# Patient Record
Sex: FEMALE | Race: WHITE | NOT HISPANIC OR LATINO | Employment: FULL TIME | URBAN - METROPOLITAN AREA
[De-identification: names, ages, dates, MRNs, and addresses within clinical notes are randomized per-mention and may not be internally consistent; named-entity substitution may affect disease eponyms.]

---

## 2017-01-02 ENCOUNTER — OFFICE VISIT (OUTPATIENT)
Dept: FAMILY MEDICINE | Facility: CLINIC | Age: 32
End: 2017-01-02
Payer: COMMERCIAL

## 2017-01-02 VITALS
SYSTOLIC BLOOD PRESSURE: 118 MMHG | BODY MASS INDEX: 18.32 KG/M2 | OXYGEN SATURATION: 98 % | HEIGHT: 69 IN | HEART RATE: 80 BPM | RESPIRATION RATE: 14 BRPM | TEMPERATURE: 97.7 F | WEIGHT: 123.7 LBS | DIASTOLIC BLOOD PRESSURE: 60 MMHG

## 2017-01-02 DIAGNOSIS — N92.0 MENORRHAGIA WITH REGULAR CYCLE: ICD-10-CM

## 2017-01-02 DIAGNOSIS — Z00.00 ROUTINE GENERAL MEDICAL EXAMINATION AT A HEALTH CARE FACILITY: Primary | ICD-10-CM

## 2017-01-02 PROCEDURE — 99395 PREV VISIT EST AGE 18-39: CPT | Performed by: INTERNAL MEDICINE

## 2017-01-02 PROCEDURE — 87624 HPV HI-RISK TYP POOLED RSLT: CPT | Mod: 90 | Performed by: INTERNAL MEDICINE

## 2017-01-02 PROCEDURE — G0145 SCR C/V CYTO,THINLAYER,RESCR: HCPCS | Mod: 90 | Performed by: INTERNAL MEDICINE

## 2017-01-02 PROCEDURE — 99000 SPECIMEN HANDLING OFFICE-LAB: CPT | Performed by: INTERNAL MEDICINE

## 2017-01-02 RX ORDER — ETHYNODIOL DIACETATE AND ETHINYL ESTRADIOL 1 MG-35MCG
1 KIT ORAL DAILY
Qty: 84 TABLET | Refills: 4 | Status: SHIPPED | OUTPATIENT
Start: 2017-01-02 | End: 2017-12-18

## 2017-01-02 NOTE — PROGRESS NOTES
"   SUBJECTIVE:     CC: Nabila Gee is an 31 year old woman who presents for preventive health visit.       Physical  Annual:     Getting at least 3 servings of Calcium per day::  Yes    Bi-annual eye exam::  Yes    Dental care twice a year::  Yes    Sleep apnea or symptoms of sleep apnea::  None    Diet::  Regular (no restrictions)    Frequency of exercise::  1 day/week    Duration of exercise::  15-30 minutes    Taking medications regularly::  Yes    Medication side effects::  None    Additional concerns today::  No      HISTORY OF PRESENT ILLNESS:    Intermittent Chest Pain:  The patient reports she often experiences intermittent chest pain which her coworkers believe is the result of increased stomach acid production or heartburn. She thinks her chest pain is due to periodic spikes in stress or anxiety.    Menorrhagia:  When asked, the patient reports she takes her current birth control medication regularly, but was told to skip the \"placebo\" doses at the end of her cycle and to continue with her next medication cycle.      Today's PHQ-2 Score:   PHQ-2 ( 1999 [employer]) 1/2/2017   Q1: Little interest or pleasure in doing things 0   Q2: Feeling down, depressed or hopeless 1   PHQ-2 Score 1   Little interest or pleasure in doing things -   Feeling down, depressed or hopeless -   PHQ-2 Score -     Abuse: Current or Past(Physical, Sexual or Emotional)- No  Do you feel safe in your environment - Yes    Social History   Substance Use Topics     Smoking status: Former Smoker -- 1.00 packs/day for 4 years     Smokeless tobacco: Never Used     Alcohol Use: 2.5 oz/week     5 Standard drinks or equivalent per week      Comment: 1 glass of wine daily     The patient does not drink >3 drinks per day nor >7 drinks per week.    Recent Labs   Lab Test  04/01/14   0726  09/11/11   1748   CHOL  132  113   HDL  56  48*   LDL  59  52   TRIG  80  62   CHOLHDLRATIO  2.3  2.3     Reviewed orders with patient.  Reviewed health " maintenance and updated orders accordingly - Yes    Mammo Decision Support:  Mammogram not appropriate for this patient based on age.    Pertinent mammograms are reviewed under the imaging tab.  History of abnormal Pap smear: YES - LSIL with STEPHANIE 1/2010 - Has completed a couple of colposcopies since but has not been treated; 2011 and on Pap smears have been normal; Continue performing PAP at routine interval (every 3 years recommended).  All Histories reviewed and updated in Epic.    Past Medical History   Diagnosis Date     SIADH (syndrome of inappropriate ADH production) (H) 9/11     hospitalized. will see Endocrinologist     LSIL (low grade squamous intraepithelial lesion) on Pap smear 2009, 2010     STEPHANIE I on colp 4/2010      Past Surgical History   Procedure Laterality Date     Surgical history of -   4/10     colposcopy; mild dysplasia; no treatment.     Surgical history of -   1/11     colposcopy; recommended repeat     Dilation and curettage, hysteroscopy diagnostic, combined N/A 2/12/2016     Procedure: COMBINED DILATION AND CURETTAGE, HYSTEROSCOPY DIAGNOSTIC;  Surgeon: Aj Clemons MD;  Location:  OR         REVIEW OF SYSTEMS:  C: NEGATIVE for fever, chills, or change in weight  E: NEGATIVE for vision changes or irritation  E/M: NEGATIVE for ear, mouth and throat problems  R: NEGATIVE for significant cough or SOB  CV:  NEGATIVE for palpitations or peripheral edema  GI: NEGATIVE for nausea, abdominal pain, or change in bowel habits  B: NEGATIVE for masses, tenderness, or nipple discharge  : Hx of Excessive or Frequent Menstruation - use of Zovia- targeting 4 cycles per year; NEGATIVE for unusual urinary or vaginal symptoms;   M: NEGATIVE for significant arthralgias or myalgia  N: NEGATIVE for weakness, dizziness or paresthesias  E: NEGATIVE for temperature intolerance, or skin/hair changes  P: NEGATIVE for changes in mood or affect    This document serves as a record of the services and decisions  "personally performed and made by Lilian Santiago MD. It was created on her behalf by Alethea Murray, a trained medical scribe. The creation of this document is based the provider's statements to the medical scribe.  Alethea Murray, January 2, 2017 11:21 AM     Problem list, Medication list, Allergies, and Medical/Social/Surgical histories reviewed in [facility] and updated as appropriate.  Labs reviewed in EPIC  OBJECTIVE:     /60 mmHg  Pulse 80  Temp(Src) 97.7  F (36.5  C) (Oral)  Resp 14  Ht 5' 8.5\" (1.74 m)  Wt 123 lb 11.2 oz (56.11 kg)  BMI 18.53 kg/m2  SpO2 98%  LMP 08/02/2016 (Approximate)    EXAM:  GENERAL: Patient appears healthy, alert and not distressed.  EYES: Eyes appear grossly normal to inspection, with normal conjunctivae and sclerae  HENT: Ear canals and TM's appear normal, mouth is without ulcers or lesions, oropharynx is clear and oral mucous membranes are moist  NECK: No adenopathy present, no asymmetry, masses, or scars noted, thyroid is normal to palpation  RESP: Lungs are clear to auscultation - no rales, rhonchi or wheezes present  CV: Regular rate and rhythm, normal S1 S2 heart sounds, no ectopy, no peripheral edema present, peripheral pulses normal, no carotid bruit.  ABDOMEN: Soft, nontender, no hepatosplenomegaly, no masses, normal bowel sounds  BREAST: Normal without masses, tenderness or nipple discharge and no palpable axillary masses or adenopathy   (female): Normal female external genitalia, normal urethral meatus, vaginal mucosa, normal cervix/adnexa/uterus without masses or discharge  MS: No gross musculoskeletal defects noted, no edema, gait is age appropriate without ataxia, normal heel/toe walk  BACK: Normal forward bend and forward curvature.  SKIN: No suspicious rashes, lesions, or moles.  NEURO: Patient exhibits normal strength and tone, normal speech and mentation, DTR's fully intact, normal Romberg's test  PSYCH: Mentation appears normal, affect is " "normal/bright    Diagnostic Test Results:  No results found for this or any previous visit (from the past 24 hour(s)).     ASSESSMENT/PLAN:     (Z00.00) Routine general medical examination at a health care facility  (primary encounter diagnosis)  Comment: Annual preventative visit. Medications reviewed with patient. TDAP 2009  Continue to eat healthy and get regular exercise - goal of 150 minutes per week  Plan: Pap imaged thin layer screen with HPV -         recommended age 30 - 65 years (select HPV order        below), HPV High Risk Types DNA Cervical          (N92.0) Menorrhagia with regular cycle  Comment: Currently taking Zovia regularly, but was told to skip the \"placebo\" week and to continue onto her next medication cycle; Symptoms have improved.  Plan: ethynodiol-ethinyl estradiol (ZOVIA 1/35E, 28,)        1-35 MG-MCG per tablet            COUNSELING:  Reviewed preventive health counseling, as reflected in patient instructions       Regular exercise       Healthy diet/nutrition     reports that she has quit smoking. She has never used smokeless tobacco.  Estimated body mass index is 18.53 kg/(m^2) as calculated from the following:    Height as of this encounter: 5' 8.5\" (1.74 m).    Weight as of this encounter: 123 lb 11.2 oz (56.11 kg).       Counseling Resources:  ATP IV Guidelines  Pooled Cohorts Equation Calculator  Breast Cancer Risk Calculator  FRAX Risk Assessment  ICSI Preventive Guidelines  Dietary Guidelines for Americans, 2010  USDA's MyPlate  ASA Prophylaxis  Lung CA Screening    The information in this document, created by a medical scribe for me, accurately reflects the services I personally performed and the decisions made by me. I have reviewed and approved this document for accuracy.  Dr. Lilian Santiago, 11:34 AM, January 2, 2017    Lilian Santiago MD  Internal Medicine   [facility]    Answers for HPI/ROS submitted by the patient on 1/1/2017   PHQ-2 Depressed: Not at all, " Several days  PHQ-2 Score: 1

## 2017-01-02 NOTE — PATIENT INSTRUCTIONS
Preventive Health Recommendations  Female Ages 26 - 39  Yearly exam:   See your health care provider every year in order to    Review health changes.     Discuss preventive care.      Review your medicines if you your doctor has prescribed any.    Until age 30: Get a Pap test every three years (more often if you have had an abnormal result).    After age 30: Talk to your doctor about whether you should have a Pap test every 3 years or have a Pap test with HPV screening every 5 years.   You do not need a Pap test if your uterus was removed (hysterectomy) and you have not had cancer.  You should be tested each year for STDs (sexually transmitted diseases), if you're at risk.   Talk to your provider about how often to have your cholesterol checked.  If you are at risk for diabetes, you should have a diabetes test (fasting glucose).  Shots: Get a flu shot each year. Get a tetanus shot every 10 years.   Nutrition:     Eat at least 5 servings of fruits and vegetables each day.    Eat whole-grain bread, whole-wheat pasta and brown rice instead of white grains and rice.    Talk to your provider about Calcium and Vitamin D.     Lifestyle    Exercise at least 150 minutes a week (30 minutes a day, 5 days of the week). This will help you control your weight and prevent disease.    Limit alcohol to one drink per day.    No smoking.     Wear sunscreen to prevent skin cancer.    See your dentist every six months for an exam and cleaning.      I recommend you start monitoring for potential triggers at the onset of your chest pain. Stress and anxiety can cause an increased production of stomach acid which can cause chest pain. Therefore, I recommend that you purchase some over-the-counter Zantac, Rolaids, or Tums to keep on hand which will help by neutralizing excess stomach acid.

## 2017-01-02 NOTE — NURSING NOTE
"Chief Complaint   Patient presents with     Physical     PT IS NOT FASTING   ATE SOME EGGS AND HASHBROWNS AT 9;15       Initial /60 mmHg  Pulse 80  Temp(Src) 97.7  F (36.5  C) (Oral)  Resp 14  Ht 5' 8.5\" (1.74 m)  Wt 123 lb 11.2 oz (56.11 kg)  BMI 18.53 kg/m2  SpO2 98%  LMP 08/02/2016 (Approximate) Estimated body mass index is 18.53 kg/(m^2) as calculated from the following:    Height as of this encounter: 5' 8.5\" (1.74 m).    Weight as of this encounter: 123 lb 11.2 oz (56.11 kg).  BP completed using cuff size: regular    "

## 2017-01-02 NOTE — MR AVS SNAPSHOT
After Visit Summary   1/2/2017    Nabila Gee    MRN: [id number]           Patient Information     Date Of Birth          1985        Visit Information        Provider Department      1/2/2017 11:00 AM Lilian Santiago MD [facility]        Today's Diagnoses     Routine general medical examination at a health care facility    -  1     Menorrhagia with regular cycle           Care Instructions      Preventive Health Recommendations  Female Ages 26 - 39  Yearly exam:   See your health care provider every year in order to    Review health changes.     Discuss preventive care.      Review your medicines if you your doctor has prescribed any.    Until age 30: Get a Pap test every three years (more often if you have had an abnormal result).    After age 30: Talk to your doctor about whether you should have a Pap test every 3 years or have a Pap test with HPV screening every 5 years.   You do not need a Pap test if your uterus was removed (hysterectomy) and you have not had cancer.  You should be tested each year for STDs (sexually transmitted diseases), if you're at risk.   Talk to your provider about how often to have your cholesterol checked.  If you are at risk for diabetes, you should have a diabetes test (fasting glucose).  Shots: Get a flu shot each year. Get a tetanus shot every 10 years.   Nutrition:     Eat at least 5 servings of fruits and vegetables each day.    Eat whole-grain bread, whole-wheat pasta and brown rice instead of white grains and rice.    Talk to your provider about Calcium and Vitamin D.     Lifestyle    Exercise at least 150 minutes a week (30 minutes a day, 5 days of the week). This will help you control your weight and prevent disease.    Limit alcohol to one drink per day.    No smoking.     Wear sunscreen to prevent skin cancer.    See your dentist every six months for an exam and cleaning.      I recommend you start monitoring for potential  "triggers at the onset of your chest pain. Stress and anxiety can cause an increased production of stomach acid which can cause chest pain. Therefore, I recommend that you purchase some over-the-counter Zantac, Rolaids, or Tums to keep on hand which will help by neutralizing excess stomach acid.        Follow-ups after your visit        Who to contact     If you have questions or need follow up information about today's clinic visit or your schedule please contact [facility] directly at [phone].  Normal or non-critical lab and imaging results will be communicated to you by [employer], letter or phone within 4 business days after the clinic has received the results. If you do not hear from us within 7 days, please contact the clinic through [employer] or phone. If you have a critical or abnormal lab result, we will notify you by phone as soon as possible.  Submit refill requests through [facility] or call your pharmacy and they will forward the refill request to us. Please allow 3 business days for your refill to be completed.          Additional Information About Your Visit        [facility] Information     [facility] gives you secure access to your electronic health record. If you see a primary care provider, you can also send messages to your care team and make appointments. If you have questions, please call your primary care clinic.  If you do not have a primary care provider, please call [phone] and they will assist you.        Your Vitals Were     Pulse Temperature Respirations    80 97.7  F (36.5  C) (Oral) 14    Height BMI (Body Mass Index) Pulse Oximetry    5' 8.5\" (1.74 m) 18.53 kg/m2 98%    Last Period          08/02/2016 (Approximate)         Blood Pressure from Last 3 Encounters:   01/02/17 118/60   02/12/16 118/68   02/09/16 106/62    Weight from Last 3 Encounters:   01/02/17 123 lb 11.2 oz (56.11 kg)   02/12/16 118 lb (53.524 kg)   02/09/16 118 lb 14.4 oz (53.933 kg)              We " Performed the Following     HPV High Risk Types DNA Cervical     Pap imaged thin layer screen with HPV - recommended age 30 - 65 years (select HPV order below)          Today's Medication Changes          These changes are accurate as of: 1/2/17 11:33 AM.  If you have any questions, ask your nurse or doctor.               Stop taking these medicines if you haven't already. Please contact your care team if you have questions.     HYDROcodone-acetaminophen 5-325 MG per tablet   Commonly known as:  NORCO   Stopped by:  Lilian Santiago MD           ondansetron 4 MG ODT tab   Commonly known as:  ZOFRAN-ODT   Stopped by:  Lilian Santiago MD                Where to get your medicines      These medications were sent to [facility] [address] [facility] [address] [facility] AT [place] [address] & [facility]  [address] [facility] [address]     Phone:  [phone]    - ethynodiol-ethinyl estradiol 1-35 MG-MCG per tablet             Primary Care Provider Office Phone # Fax #    Lilian Santiago [phone]       [facility] [address] [facility] [address]        Thank you!     Thank you for choosing [facility]  for your care. Our goal is always to provide you with excellent care. Hearing back from our patients is one way we can continue to improve our services. Please take a few minutes to complete the written survey that you may receive in the mail after your visit with us. Thank you!             Your Updated Medication List - Protect others around you: Learn how to safely use, store and throw away your medicines at www.disposemymeds.org.          This list is accurate as of: 1/2/17 11:33 AM.  Always use your most recent med list.                   Brand Name Dispense Instructions for use    ethynodiol-ethinyl estradiol 1-35 MG-MCG per tablet    ZOVIA 1/35E (28)    84 tablet    Take 1 tablet by mouth daily       FLONASE 50 MCG/ACT  spray   Generic drug:  fluticasone     1 Package    Spray 1-2 sprays into both nostrils daily as needed for rhinitis or allergies

## 2017-01-04 LAB
COPATH REPORT: NORMAL
PAP: NORMAL

## 2017-01-06 LAB
FINAL DIAGNOSIS: NORMAL
HPV HR 12 DNA CVX QL NAA+PROBE: NEGATIVE
HPV16 DNA SPEC QL NAA+PROBE: NEGATIVE
HPV18 DNA SPEC QL NAA+PROBE: NEGATIVE
SPECIMEN DESCRIPTION: NORMAL

## 2017-12-18 DIAGNOSIS — N92.0 MENORRHAGIA WITH REGULAR CYCLE: ICD-10-CM

## 2017-12-20 RX ORDER — ETHYNODIOL DIACETATE AND ETHINYL ESTRADIOL 1 MG-35MCG
KIT ORAL
Qty: 84 TABLET | Refills: 0 | Status: SHIPPED | OUTPATIENT
Start: 2017-12-20 | End: 2018-02-22

## 2017-12-20 NOTE — TELEPHONE ENCOUNTER
Prescription approved per [facility] Refill Protocol.  Jeane Lei RN

## 2018-01-16 ENCOUNTER — OFFICE VISIT (OUTPATIENT)
Dept: FAMILY MEDICINE | Facility: CLINIC | Age: 33
End: 2018-01-16
Payer: COMMERCIAL

## 2018-01-16 VITALS
TEMPERATURE: 98.1 F | BODY MASS INDEX: 19.05 KG/M2 | RESPIRATION RATE: 15 BRPM | WEIGHT: 128.6 LBS | DIASTOLIC BLOOD PRESSURE: 60 MMHG | OXYGEN SATURATION: 100 % | HEART RATE: 65 BPM | HEIGHT: 69 IN | SYSTOLIC BLOOD PRESSURE: 112 MMHG

## 2018-01-16 DIAGNOSIS — S33.5XXA LUMBAR SPRAIN, INITIAL ENCOUNTER: Primary | ICD-10-CM

## 2018-01-16 DIAGNOSIS — Z83.49 FAMILY HISTORY OF THYROID DISEASE: ICD-10-CM

## 2018-01-16 DIAGNOSIS — R63.5 WEIGHT GAIN: ICD-10-CM

## 2018-01-16 DIAGNOSIS — M62.830 PARASPINAL MUSCLE SPASM: ICD-10-CM

## 2018-01-16 DIAGNOSIS — L72.3 SEBACEOUS CYST: ICD-10-CM

## 2018-01-16 DIAGNOSIS — R59.9 REACTIVE LYMPHADENOPATHY: ICD-10-CM

## 2018-01-16 PROCEDURE — 36415 COLL VENOUS BLD VENIPUNCTURE: CPT | Performed by: INTERNAL MEDICINE

## 2018-01-16 PROCEDURE — 84443 ASSAY THYROID STIM HORMONE: CPT | Performed by: INTERNAL MEDICINE

## 2018-01-16 PROCEDURE — 99214 OFFICE O/P EST MOD 30 MIN: CPT | Performed by: INTERNAL MEDICINE

## 2018-01-16 RX ORDER — CYCLOBENZAPRINE HCL 10 MG
5-10 TABLET ORAL 3 TIMES DAILY PRN
Qty: 20 TABLET | Refills: 1 | Status: SHIPPED | OUTPATIENT
Start: 2018-01-16 | End: 2018-03-07

## 2018-01-16 RX ORDER — NAPROXEN 500 MG/1
500 TABLET ORAL 2 TIMES DAILY PRN
Qty: 60 TABLET | Refills: 0 | Status: SHIPPED | OUTPATIENT
Start: 2018-01-16 | End: 2018-03-07

## 2018-01-16 RX ORDER — CETIRIZINE HYDROCHLORIDE 10 MG/1
10 TABLET ORAL DAILY PRN
COMMUNITY
End: 2022-12-13

## 2018-01-16 NOTE — PATIENT INSTRUCTIONS
Exercises to Strengthen Your Lower Back  Strong lower back and abdominal muscles work together to support your spine. The exercises below will help strengthen the lower back. It is important that you begin exercising slowly and increase levels gradually.  Always begin any exercise program with stretching. If you feel pain while doing any of these exercises, stop and talk to your doctor about a more specific exercise program that better suits your condition.   Low back stretch  The point of stretching is to make you more flexible and increase your range of motion. Stretch only as much as you are able. Stretch slowly. Do not push your stretch to the limit. If at any point you feel pain while stretching, this is your (temporary) limit.    Lie on your back with your knees bent and both feet on the ground.    Slowly raise your left knee to your chest as you flatten your lower back against the floor. Hold for 5 seconds.    Relax and repeat the exercise with your right knee.    Do 10 of these exercises for each leg.    Repeat hugging both knees to your chest at the same time.  Building lower back strength  Start your exercise routine with 10 to 30 minutes a day, 1 to 3 times a day.  Initial exercises  Lying on your back:  1. Ankle pumps: Move your foot up and down, towards your head, and then away. Repeat 10 times with each foot.  2. Heel slides: Slowly bend your knee, drawing the heel of your foot towards you. Then slide your heel/foot from you, straightening your knee. Do not lift your foot off the floor (this is not a leg lift).  3. Abdominal contraction: Bend your knees and put your hands on your stomach. Tighten your stomach muscles. Hold for 5 seconds, then relax. Repeat 10 times.  4. Straight leg raise: Bend one leg at the knee and keep the other leg straight. Tighten your stomach muscles. Slowly lift your straight leg 6 to 12 inches off the floor and hold for up to 5 seconds. Repeat 10 times on each  side.  Standin. Wall squats: Stand with your back against the wall. Move your feet about 12 inches away from the wall. Tighten your stomach muscles, and slowly bend your knees until they are at about a 45 degree angle. Do not go down too far. Hold about 5 seconds. Then slowly return to your starting position. Repeat 10 times.  2. Heel raises: Stand facing the wall. Slowly raise the heels of your feet up and down, while keeping your toes on the floor. If you have trouble balancing, you can touch the wall with your hands. Repeat 10 times.  More advanced exercises  When you feel comfortable enough, try these exercises.  1. Kneeling lumbar extension: Begin on your hands and knees. At the same time, raise and straighten your right arm and left leg until they are parallel to the ground. Hold for 2 seconds and come back slowly to a starting position. Repeat with left arm and right leg, alternating 10 times.  2. Prone lumbar extension: Lie face down, arms extended overhead, palms on the floor. At the same time, raise your right arm and left leg as high as comfortably possible. Hold for 10 seconds and slowly return to start. Repeat with left arm and right leg, alternating 10 times. Gradually build up to 20 times. (Advanced: Repeat this exercise raising both arms and both legs a few inches off the floor at the same time. Hold for 5 seconds and release.)  3. Pelvic tilt: Lie on the floor on your back with your knees bent at 90 degrees. Your feet should be flat on the floor. Inhale, exhale, then slowly contract your abdominal muscles bringing your navel toward your spine. Let your pelvis rock back until your lower back is flat on the floor. Hold for 10 seconds while breathing smoothly.  4. Abdominal crunch: Perform a pelvic tilt (above) flattening your lower back against the floor. Holding the tension in your abdominal muscles, take another breath and raise your shoulder blades off the ground (this is not a full sit-up).  Keep your head in line with your body (don t bend your neck forward). Hold for 2 seconds, then slowly lower.  Date Last Reviewed: 6/1/2016 2000-2017 [employer]. [address] [place] [address], [place] [address]. All rights reserved. This information is not intended as a substitute for professional medical care. Always follow your healthcare professional's instructions.

## 2018-01-16 NOTE — PROGRESS NOTES
SUBJECTIVE:   Nabila Gee is a 32 year old female who presents to clinic today for the following health issues:    Weight Concern:   Patient states that she has been eating healthy and exercising, but has gained about 10 pounds over the past 1 1/2 years. Is concerned about this weight gain.     Back Pain    Duration: 4 days        Specific cause: none, no injury/trauma    Description:   Location of pain: low back bilateral  Character of pain: sharp, dull ache, cramping, constant and waxing and waning  Pain radiation: none  New numbness or weakness in legs, not attributed to pain: no     Intensity: Currently 4/10, At its worst 9/10    History:   Pain interferes with job: YES  History of back problems: no prior back problems  Any previous MRI or X-rays: None  Sees a specialist for back pain: No  Therapies tried without relief: heat and NSAIDs    Alleviating factors:   Improved by: nothing      Precipitating factors:  Worsened by: Bending and Sitting    Small bumps behind the right ear    Duration: for about 4 days    Description (location/character/radiation): 2 small lumps behind the right ear.    Intensity: mild    Accompanying signs and symptoms: pain is mild and tender when touching them    History (similar episodes/previous evaluation): None    Precipitating or alleviating factors: None    Therapies tried and outcome: None     Problem list and histories reviewed & adjusted, as indicated.  Additional history: as documented    BP Readings from Last 3 Encounters:   01/16/18 112/60   01/02/17 118/60   02/12/16 118/68    Wt Readings from Last 3 Encounters:   01/16/18 128 lb 9.6 oz (58.3 kg)   01/02/17 123 lb 11.2 oz (56.1 kg)   02/12/16 118 lb (53.5 kg)        Reviewed and updated as needed this visit by clinical staff  Tobacco  Allergies  Meds  Problems  Med Hx  Surg Hx  Fam Hx  Soc Hx        Reviewed and updated as needed this visit by Provider  Allergies  Meds  Problems     ROS:  CONSTITUTIONAL:  "POSITIVE for unexplained weight gain; NEGATIVE for fever or chills  INTEGUMENTARY/SKIN: POSITIVE for 2 small bumps behind the right ear; POSITIVE for ingrown hair to scalp on right; NEGATIVE for worrisome rashes, moles or lesions  GI: NEGATIVE for nausea, abdominal pain, heartburn, or change in bowel habits  : no difficulties with urination  MUSCULOSKELETAL: POSITIVE for bilateral low back pain  NEURO: NEGATIVE for weakness, dizziness or paresthesias    This document serves as a record of the services and decisions personally performed and made by Lilian Santiago MD. It was created on her behalf by Karly Black, a trained medical scribe. The creation of this document is based on the provider's statements to the medical scribe.   Karly Black, 3:59 PM, January 16, 2018    OBJECTIVE:   /60  Pulse 65  Temp 98.1  F (36.7  C) (Oral)  Resp 15  Ht 5' 8.75\" (1.746 m)  Wt 128 lb 9.6 oz (58.3 kg)  LMP 12/21/2017 (Exact Date)  SpO2 100%  BMI 19.13 kg/m2  Body mass index is 19.13 kg/(m^2).  GENERAL: healthy, alert and no distress  NECK: right postauricular lymphadenopathy, no asymmetry, masses, or scars  MS: increased tone of paraspinal muscles bilaterally, pain limits forward bending, extremities normal- no gross deformities noted  SKIN: small sebaceous cyst to scalp on right side, healing, no suspicious lesions or rashes  PSYCH: mentation appears normal and affect normal/bright  LYMPH: two small reactive postauricular lymph nodes on the right, nontender    Reactive lymph nodes, nontender   Diagnostic Test Results:  none     ASSESSMENT/PLAN:   (S33.5XXA) Lumbar sprain, initial encounter (primary encounter diagnosis)  Comment: forward bending limited by pain; ice/heat; walk in spine; NSAID and short term Cyclobenzaprine  Plan: naproxen (NAPROSYN) 500 MG tablet,         cyclobenzaprine (FLEXERIL) 10 MG tablet, [facility]         PT, HAND, AND CHIROPRACTIC REFERRAL          (M62.830) Paraspinal muscle spasm  Comment: noted " increased tone of paraspinal muscles; start Flexeril muscle relaxant; take naproxen one in the AM and one at night; referral to PT; alternate heat and cold; reviewed exercises and provided in AVS   Plan: cyclobenzaprine (FLEXERIL) 10 MG tablet, [facility]         PT, HAND, AND CHIROPRACTIC REFERRAL          (R63.5) Weight gain  Comment: #10 weight gain in past 2 years (since 2/2016); family hx of thyroid; would like thyroid labs done  Plan: TSH with free T4 reflex          (Z83.49) Family history of thyroid disease  Comment: mother with thyroid disease; will have thyroid labs completed today  Plan: TSH with free T4 reflex          Reactive Lymph Nodes:   Healing small sebaceous cyst to scalp on right; Two small reactive lymph nodes on right, postauricular, nontender   Recommended warm packs on right side    sebaceous cysts  No infections  Nuisance; no need for further evaluation    MEDICATIONS:   Orders Placed This Encounter   Medications     cetirizine (ZYRTEC) 10 MG tablet     Sig: Take 10 mg by mouth daily as needed for allergies     naproxen (NAPROSYN) 500 MG tablet     Sig: Take 1 tablet (500 mg) by mouth 2 times daily as needed for moderate pain     Dispense:  60 tablet     Refill:  0     cyclobenzaprine (FLEXERIL) 10 MG tablet     Sig: Take 0.5-1 tablets (5-10 mg) by mouth 3 times daily as needed for muscle spasms     Dispense:  20 tablet     Refill:  1     There are no discontinued medications.        Lilian Santiago MD  Internal Medicine  [facility] [place]    The information in this document, created by a medical scribe for me, accurately reflects the services I personally performed and the decisions made by me. I have reviewed and approved this document for accuracy.  Dr. Lilian Santiago, 4:22 PM, January 16, 2018

## 2018-01-16 NOTE — MR AVS SNAPSHOT
After Visit Summary   1/16/2018    Nabila Gee    MRN: [id number]           Patient Information     Date Of Birth          1985        Visit Information        Provider Department      1/16/2018 3:00 PM Lilian Santiago MD [facility]        Today's Diagnoses     Lumbar sprain, initial encounter    -  1    Paraspinal muscle spasm        Weight gain        Family history of thyroid disease          Care Instructions      Exercises to Strengthen Your Lower Back  Strong lower back and abdominal muscles work together to support your spine. The exercises below will help strengthen the lower back. It is important that you begin exercising slowly and increase levels gradually.  Always begin any exercise program with stretching. If you feel pain while doing any of these exercises, stop and talk to your doctor about a more specific exercise program that better suits your condition.   Low back stretch  The point of stretching is to make you more flexible and increase your range of motion. Stretch only as much as you are able. Stretch slowly. Do not push your stretch to the limit. If at any point you feel pain while stretching, this is your (temporary) limit.    Lie on your back with your knees bent and both feet on the ground.    Slowly raise your left knee to your chest as you flatten your lower back against the floor. Hold for 5 seconds.    Relax and repeat the exercise with your right knee.    Do 10 of these exercises for each leg.    Repeat hugging both knees to your chest at the same time.  Building lower back strength  Start your exercise routine with 10 to 30 minutes a day, 1 to 3 times a day.  Initial exercises  Lying on your back:  1. Ankle pumps: Move your foot up and down, towards your head, and then away. Repeat 10 times with each foot.  2. Heel slides: Slowly bend your knee, drawing the heel of your foot towards you. Then slide your heel/foot from you, straightening your  knee. Do not lift your foot off the floor (this is not a leg lift).  3. Abdominal contraction: Bend your knees and put your hands on your stomach. Tighten your stomach muscles. Hold for 5 seconds, then relax. Repeat 10 times.  4. Straight leg raise: Bend one leg at the knee and keep the other leg straight. Tighten your stomach muscles. Slowly lift your straight leg 6 to 12 inches off the floor and hold for up to 5 seconds. Repeat 10 times on each side.  Standin. Wall squats: Stand with your back against the wall. Move your feet about 12 inches away from the wall. Tighten your stomach muscles, and slowly bend your knees until they are at about a 45 degree angle. Do not go down too far. Hold about 5 seconds. Then slowly return to your starting position. Repeat 10 times.  2. Heel raises: Stand facing the wall. Slowly raise the heels of your feet up and down, while keeping your toes on the floor. If you have trouble balancing, you can touch the wall with your hands. Repeat 10 times.  More advanced exercises  When you feel comfortable enough, try these exercises.  1. Kneeling lumbar extension: Begin on your hands and knees. At the same time, raise and straighten your right arm and left leg until they are parallel to the ground. Hold for 2 seconds and come back slowly to a starting position. Repeat with left arm and right leg, alternating 10 times.  2. Prone lumbar extension: Lie face down, arms extended overhead, palms on the floor. At the same time, raise your right arm and left leg as high as comfortably possible. Hold for 10 seconds and slowly return to start. Repeat with left arm and right leg, alternating 10 times. Gradually build up to 20 times. (Advanced: Repeat this exercise raising both arms and both legs a few inches off the floor at the same time. Hold for 5 seconds and release.)  3. Pelvic tilt: Lie on the floor on your back with your knees bent at 90 degrees. Your feet should be flat on the floor.  Inhale, exhale, then slowly contract your abdominal muscles bringing your navel toward your spine. Let your pelvis rock back until your lower back is flat on the floor. Hold for 10 seconds while breathing smoothly.  4. Abdominal crunch: Perform a pelvic tilt (above) flattening your lower back against the floor. Holding the tension in your abdominal muscles, take another breath and raise your shoulder blades off the ground (this is not a full sit-up). Keep your head in line with your body (don t bend your neck forward). Hold for 2 seconds, then slowly lower.  Date Last Reviewed: 6/1/2016 2000-2017 [employer]. [address] [place] [address]. All rights reserved. This information is not intended as a substitute for professional medical care. Always follow your healthcare professional's instructions.                Follow-ups after your visit        Additional Services     [facility] PT, HAND, AND CHIROPRACTIC REFERRAL       **This order will print in the [facility] Scheduling Office**    Physical Therapy, Hand Therapy and Chiropractic Care are available through:    *[facility]  *[facility]  *[facility]    Call one number to schedule at any of the above locations: [phone].    Your provider has referred you to: Physical Therapy at [facility] or [facility] [place] [place]    Indication/Reason for Referral: Low Back Pain  Onset of Illness: 4 days, no injury recalled  Therapy Orders: Evaluate and Treat  Special Programs: Walk in Spine  Special Request: None    [place]      Additional Comments for the Therapist or Chiropractor:       Please be aware that coverage of these services is subject to the terms and limitations of your health insurance plan.  Call member services at your health plan with any benefit or coverage questions.      Please bring the following to your appointment:    *Your personal calendar for scheduling future appointments  *Comfortable clothing    "               Who to contact     If you have questions or need follow up information about today's clinic visit or your schedule please contact [facility] directly at [phone].  Normal or non-critical lab and imaging results will be communicated to you by Fileboardhart, letter or phone within 4 business days after the clinic has received the results. If you do not hear from us within 7 days, please contact the clinic through Fileboardhart or phone. If you have a critical or abnormal lab result, we will notify you by phone as soon as possible.  Submit refill requests through TinderBox or call your pharmacy and they will forward the refill request to us. Please allow 3 business days for your refill to be completed.          Additional Information About Your Visit        TinderBox Information     TinderBox gives you secure access to your electronic health record. If you see a primary care provider, you can also send messages to your care team and make appointments. If you have questions, please call your primary care clinic.  If you do not have a primary care provider, please call [phone] and they will assist you.        Care EveryWhere ID     This is your Care EveryWhere ID. This could be used by other organizations to access your [place] medical records  [id number]        Your Vitals Were     Pulse Temperature Respirations Height Last Period Pulse Oximetry    65 98.1  F (36.7  C) (Oral) 15 5' 8.75\" (1.746 m) 12/21/2017 (Exact Date) 100%    BMI (Body Mass Index)                   19.13 kg/m2            Blood Pressure from Last 3 Encounters:   01/16/18 112/60   01/02/17 118/60   02/12/16 118/68    Weight from Last 3 Encounters:   01/16/18 128 lb 9.6 oz (58.3 kg)   01/02/17 123 lb 11.2 oz (56.1 kg)   02/12/16 118 lb (53.5 kg)              We Performed the Following     [facility] PT, HAND, AND CHIROPRACTIC REFERRAL     TSH with free T4 reflex          Today's Medication Changes          These changes are accurate as " of: 1/16/18  4:20 PM.  If you have any questions, ask your nurse or doctor.               Start taking these medicines.        Dose/Directions    cyclobenzaprine 10 MG tablet   Commonly known as:  FLEXERIL   Used for:  Lumbar sprain, initial encounter, Paraspinal muscle spasm   Started by:  Lilian Santiago MD        Dose:  5-10 mg   Take 0.5-1 tablets (5-10 mg) by mouth 3 times daily as needed for muscle spasms   Quantity:  20 tablet   Refills:  1       naproxen 500 MG tablet   Commonly known as:  NAPROSYN   Used for:  Lumbar sprain, initial encounter   Started by:  Lilian Santiago MD        Dose:  500 mg   Take 1 tablet (500 mg) by mouth 2 times daily as needed for moderate pain   Quantity:  60 tablet   Refills:  0            Where to get your medicines      These medications were sent to [facility] [address] [facility] [address] [facility] AT [place] [address] & [facility]  [address] [facility] [address]     Phone:  [phone]     cyclobenzaprine 10 MG tablet    naproxen 500 MG tablet                Primary Care Provider Office Phone # Fax #    Lilian Santiago [phone] [id number]  [facility] [address]        Equal Access to Services     BENITO ROE AH: Hadii aad ku hadasho Soomaali, waaxda luqadaha, qaybta kaalmada adeegyada, waxay idiin hayaan elliot khkamron [place] . So [facility] [phone].    ATENCIÓN: Si habla español, tiene a nelson disposición servicios gratuitos de asistencia lingüística. ame al [phone].    We comply with applicable federal civil rights laws and [place] laws. We do not discriminate on the basis of race, color, national origin, age, disability, sex, sexual orientation, or gender identity.            Thank you!     Thank you for choosing [facility]  for your care. Our goal is always to provide you with excellent care. Hearing back from our patients is one way we can continue to improve our services.  Please take a few minutes to complete the written survey that you may receive in the mail after your visit with us. Thank you!             Your Updated Medication List - Protect others around you: Learn how to safely use, store and throw away your medicines at www.disposemymeds.org.          This list is accurate as of: 1/16/18  4:20 PM.  Always use your most recent med list.                   Brand Name Dispense Instructions for use Diagnosis    cetirizine 10 MG tablet    zyrTEC     Take 10 mg by mouth daily as needed for allergies        cyclobenzaprine 10 MG tablet    FLEXERIL    20 tablet    Take 0.5-1 tablets (5-10 mg) by mouth 3 times daily as needed for muscle spasms    Lumbar sprain, initial encounter, Paraspinal muscle spasm       FLONASE 50 MCG/ACT spray   Generic drug:  fluticasone     1 Package    Spray 1-2 sprays into both nostrils daily as needed for rhinitis or allergies    Nasal discharge, Seasonal allergies       naproxen 500 MG tablet    NAPROSYN    60 tablet    Take 1 tablet (500 mg) by mouth 2 times daily as needed for moderate pain    Lumbar sprain, initial encounter       ZOVIA 1/35E (28) 1-35 MG-MCG per tablet   Generic drug:  ethynodiol-ethinyl estradiol     84 tablet    TAKE 1 TABLET BY MOUTH DAILY    Menorrhagia with regular cycle

## 2018-01-17 LAB — TSH SERPL DL<=0.005 MIU/L-ACNC: 1.83 MU/L (ref 0.4–4)

## 2018-02-22 DIAGNOSIS — N92.0 MENORRHAGIA WITH REGULAR CYCLE: ICD-10-CM

## 2018-02-22 NOTE — TELEPHONE ENCOUNTER
"Requested Prescriptions   Pending Prescriptions Disp Refills     ZOVIA 1/35E, 28, 1-35 MG-MCG per tablet [Pharmacy Med Name: ZOVIA 1/35 TABLETS 28S]  Last Written Prescription Date:  12/20/17  Last Fill Quantity: 84,  # refills: 0   Last office visit: 1/16/2018 with prescribing provider:  1/16/2018     Future Office Visit:     84 tablet 0     Sig: TAKE 1 TABLET BY MOUTH DAILY    Contraceptives Protocol Passed    2/22/2018  6:41 AM       Passed - Patient is not a current smoker if age is 35 or older       Passed - Recent or future visit with authorizing provider's specialty    Patient had office visit in the last year or has a visit in the next 30 days with authorizing provider.  See \"Patient Info\" tab in inbasket, or \"Choose Columns\" in Meds & Orders section of the refill encounter.            Passed - No active pregnancy on record       Passed - No positive pregnancy test in past 12 months          "

## 2018-02-27 ENCOUNTER — MYC MEDICAL ADVICE (OUTPATIENT)
Dept: PEDIATRICS | Facility: CLINIC | Age: 33
End: 2018-02-27

## 2018-02-27 RX ORDER — ETHYNODIOL DIACETATE AND ETHINYL ESTRADIOL 1 MG-35MCG
KIT ORAL
Qty: 28 TABLET | Refills: 0 | Status: SHIPPED | OUTPATIENT
Start: 2018-02-27 | End: 2018-03-07

## 2018-02-27 NOTE — TELEPHONE ENCOUNTER
TC-patient is due for a physical, please call to schedule.    Medication is being filled for 1 time refill only due to:  Patient needs to be seen because it has been more than one year since last visit.   Anastasiia Khan RN  Message handled by Nurse Triage.

## 2018-03-07 ENCOUNTER — OFFICE VISIT (OUTPATIENT)
Dept: FAMILY MEDICINE | Facility: CLINIC | Age: 33
End: 2018-03-07
Payer: COMMERCIAL

## 2018-03-07 VITALS
TEMPERATURE: 98 F | HEART RATE: 71 BPM | WEIGHT: 127.6 LBS | HEIGHT: 69 IN | SYSTOLIC BLOOD PRESSURE: 112 MMHG | RESPIRATION RATE: 15 BRPM | BODY MASS INDEX: 18.9 KG/M2 | OXYGEN SATURATION: 97 % | DIASTOLIC BLOOD PRESSURE: 60 MMHG

## 2018-03-07 DIAGNOSIS — Z00.00 ROUTINE HISTORY AND PHYSICAL EXAMINATION OF ADULT: Primary | ICD-10-CM

## 2018-03-07 DIAGNOSIS — N92.0 MENORRHAGIA WITH REGULAR CYCLE: ICD-10-CM

## 2018-03-07 DIAGNOSIS — Z30.41 ORAL CONTRACEPTIVE PILL SURVEILLANCE: ICD-10-CM

## 2018-03-07 PROCEDURE — 99395 PREV VISIT EST AGE 18-39: CPT | Performed by: INTERNAL MEDICINE

## 2018-03-07 RX ORDER — ETHYNODIOL DIACETATE AND ETHINYL ESTRADIOL 1 MG-35MCG
1 KIT ORAL DAILY
Qty: 84 TABLET | Refills: 3 | Status: SHIPPED | OUTPATIENT
Start: 2018-03-07 | End: 2018-12-22

## 2018-03-07 NOTE — PROGRESS NOTES
"   SUBJECTIVE:   CC: Nabila Gee is an 32 year old woman who presents for preventive health visit.     Physical   Annual:     Getting at least 3 servings of Calcium per day::  Yes    Bi-annual eye exam::  Yes    Dental care twice a year::  Yes    Sleep apnea or symptoms of sleep apnea::  None    Diet::  Regular (no restrictions)    Frequency of exercise::  2-3 days/week    Duration of exercise::  30-45 minutes    Taking medications regularly::  Yes    Medication side effects::  None    Additional concerns today::  YES          Weight Gain: Patient reports that over the past year she has gained about 10 pounds. She states that she has always been steady around 115-118 lbs, where now she is at 127 lbs. Patient reports that the weight gain is very noticeable, as she \"doesn't feel as toned\". She notes that she has been eating healthier for the most part, however she expresses that she does have an unhealthy relationship with food where she states she has a \"total lack of self-control\". Patient also notes that she has been exercising more, however it has had no impact on her weight.     Swelling of Ankles: Patient reports that a couple weeks she noticed swelling in her bilateral ankles while she traveled in the car for about 4 hours to [facility]. She states that she noticed that the swelling resolved the next day. Recommended movement every 90 minutes of travel.    Swelling of Eyelids: Patient states that following her return from [facility] she noticed intermittent swelling to her upper and lower eyelids. She reports that she has been decreasing her sodium intake with no relief. Patient denies any recent piercing's.      Foot Pain: Patient reports that for the last few months she has been experiencing pain to the balls of her bilateral feet. She can't distinguish any particular cause of the pain or what activities exacerbate the pain. Recommended use of arch support daily.       Today's PHQ-2 Score:   PHQ-2 ( 1999 Pf[employer]) " 3/7/2018   Q1: Little interest or pleasure in doing things 0   Q2: Feeling down, depressed or hopeless 0   PHQ-2 Score 0   Q1: Little interest or pleasure in doing things Not at all   Q2: Feeling down, depressed or hopeless Not at all   PHQ-2 Score 0     Abuse: Current or Past(Physical, Sexual or Emotional)- No  Do you feel safe in your environment - Yes    Social History   Substance Use Topics     Smoking status: Former Smoker     Packs/day: 1.00     Years: 4.00     Smokeless tobacco: Never Used     Alcohol use 2.5 oz/week     5 Standard drinks or equivalent per week      Comment: 1 glass of wine daily       Reviewed orders with patient.  Reviewed health maintenance and updated orders accordingly - Yes  Labs reviewed in EPIC    Mammogram not appropriate for this patient based on age.    Pertinent mammograms are reviewed under the imaging tab.  History of abnormal Pap smear: NO - age 30- 65 PAP every 3 years recommended    Reviewed and updated as needed this visit by clinical staff  Tobacco  Allergies  Meds  Med Hx  Surg Hx  Fam Hx  Soc Hx      Reviewed and updated as needed this visit by Provider        Past Medical History:   Diagnosis Date     LSIL (low grade squamous intraepithelial lesion) on Pap smear 2009, 2010    STEPHANIE I on colp 4/2010     SIADH (syndrome of inappropriate ADH production) (H) 9/11    hospitalized. will see Endocrinologist      Past Surgical History:   Procedure Laterality Date     DILATION AND CURETTAGE, HYSTEROSCOPY DIAGNOSTIC, COMBINED N/A 2/12/2016    Procedure: COMBINED DILATION AND CURETTAGE, HYSTEROSCOPY DIAGNOSTIC;  Surgeon: Aj Clemons MD;  Location:  OR     SURGICAL HISTORY OF -   4/10    colposcopy; mild dysplasia; no treatment.     SURGICAL HISTORY OF -   1/11    colposcopy; recommended repeat       Review of Systems  CONSTITUTIONAL: POSITIVE for weight gain, 10 lbs in last 1 year; NEGATIVE for fever or chills  INTEGUMENTARY/SKIN: NEGATIVE for worrisome rashes, moles  "or lesions  EYES: POSITIVE for swelling to bilateral upper and lower eyelids; NEGATIVE for vision changes  ENT: NEGATIVE for ear, mouth and throat problems  RESP: NEGATIVE for significant cough or SOB  BREAST: NEGATIVE for masses, tenderness or discharge  CV: NEGATIVE for chest pain, palpitations or peripheral edema  GI: NEGATIVE for nausea, abdominal pain, heartburn, or change in bowel habits   female: Use of oral contraceptive, taking Zovia; Hx of menorrhagia, s/p removal of endometrial polyp (2/12/16); NEGATIVE for unusual vaginal or urinary symptoms, normal menses  MUSCULOSKELETAL: POSITIVE for episode of swelling to bilateral ankles; POSITIVE for pain to ball of foot, bilaterally; NEGATIVE for significant arthralgias or myalgia  NEURO: NEGATIVE for weakness, dizziness or paresthesias  ENDOCRINE: Hx of SIADH; NEGATIVE for temperature intolerance, skin/hair changes  HEME/ALLERGY/IMMUNE: NEGATIVE for bleeding problems  PSYCHIATRIC: NEGATIVE for changes in mood or affect    This document serves as a record of the services and decisions personally performed and made by Lilian Santiago MD. It was created on her behalf by Socorro Garcia, a trained medical scribe. The creation of this document is based on the provider's statements to the medical scribe.   Socorro Garcia, 4:10 PM, March 7, 2018     OBJECTIVE:   /60  Pulse 71  Temp 98  F (36.7  C) (Oral)  Resp 15  Ht 5' 8.75\" (1.746 m)  Wt 127 lb 9.6 oz (57.9 kg)  LMP 02/28/2018 (Exact Date)  SpO2 97%  BMI 18.98 kg/m2     Physical Exam  GENERAL: healthy, alert and no distress  EYES: Eyes grossly normal to inspection, PERRL and conjunctivae and sclerae normal  HENT: ear canals and TM's normal, nose and mouth without ulcers or lesions  NECK: no adenopathy, no asymmetry, masses, or scars and thyroid normal to palpation, no carotid bruits  RESP: lungs clear to auscultation - no rales, rhonchi or wheezes  BREAST: Clinical breast exam today; normal without " masses, tenderness or nipple discharge and no palpable axillary masses or adenopathy  CV: regular rate and rhythm, normal S1 S2, no murmur, no peripheral edema and peripheral pulses strong  ABDOMEN: soft, nontender, no hepatosplenomegaly, no masses and bowel sounds normal  MS: no gross musculoskeletal defects noted, no edema  SKIN: no suspicious lesions or rashes  NEURO: Normal strength and tone, mentation intact and speech normal  PSYCH: mentation appears normal, affect normal/bright    ASSESSMENT/PLAN:   Patient voiced many concerns including swelling of ankles, swelling of eyelids, weight gain, and pain to balls of feet. Voiced understanding today where recommendations for prevention and treatment were provided. See above.     (Z00.00) Routine history and physical examination of adult  (primary encounter diagnosis)  Comment: HEALTH CARE MAINTENANCE and immunizations reviewed and up to date; Clinical breast exam today; PAP Q3 years, due 1/2020; Tetanus due next year  Plan: Annual preventative visit    (N92.0) Menorrhagia with regular cycle  Comment: s/p removal of endometrial polyp 2/12/2016; use of oral contraceptive, effective and well-tolerated; will continue to monitor   Plan: ethynodiol-ethinyl estradiol (ZOVIA 1/35E, 28,)        1-35 MG-MCG per tablet          (Z30.41) Oral contraceptive pill surveillance  Comment: Zovia effective and well-tolerated; no change in medications/dosage at this time; will continue to monitor   Plan: ethynodiol-ethinyl estradiol (ZOVIA 1/35E, 28,)        1-35 MG-MCG per tablet            COUNSELING:  Reviewed preventive health counseling, as reflected in patient instructions  Special attention given to:        Regular exercise       Healthy diet/nutrition     reports that she has quit smoking. She has a 4.00 pack-year smoking history. She has never used smokeless tobacco.    Estimated body mass index is 18.98 kg/(m^2) as calculated from the following:    Height as of this  "encounter: 5' 8.75\" (1.746 m).    Weight as of this encounter: 127 lb 9.6 oz (57.9 kg).       Counseling Resources:  ATP IV Guidelines  Pooled Cohorts Equation Calculator  Breast Cancer Risk Calculator  FRAX Risk Assessment  ICSI Preventive Guidelines  Dietary Guidelines for Americans, 2010  USDA's MyPlate  ASA Prophylaxis  Lung CA Screening    Lilian Santiago MD  Internal Medicine  [facility]    The information in this document, created by a medical scribe for me, accurately reflects the services I personally performed and the decisions made by me. I have reviewed and approved this document for accuracy.  Dr. Lilian Santiago, 4:33 PM, March 7, 2018    Answers for HPI/ROS submitted by the patient on 3/7/2018   PHQ-2 Score: 0    "

## 2018-03-07 NOTE — MR AVS SNAPSHOT
After Visit Summary   3/7/2018    Nabila Gee    MRN: [id number]           Patient Information     Date Of Birth          1985        Visit Information        Provider Department      3/7/2018 3:30 PM Lilian Santiago MD [facility]        Today's Diagnoses     Routine history and physical examination of adult    -  1    Menorrhagia with regular cycle        Oral contraceptive pill surveillance          Care Instructions      Preventive Health Recommendations  Female Ages 26 - 39  Yearly exam:   See your health care provider every year in order to    Review health changes.     Discuss preventive care.      Review your medicines if you your doctor has prescribed any.    Until age 30: Get a Pap test every three years (more often if you have had an abnormal result).    After age 30: Talk to your doctor about whether you should have a Pap test every 3 years or have a Pap test with HPV screening every 5 years.   You do not need a Pap test if your uterus was removed (hysterectomy) and you have not had cancer.  You should be tested each year for STDs (sexually transmitted diseases), if you're at risk.   Talk to your provider about how often to have your cholesterol checked.  If you are at risk for diabetes, you should have a diabetes test (fasting glucose).  Shots: Get a flu shot each year. Get a tetanus shot every 10 years.   Nutrition:     Eat at least 5 servings of fruits and vegetables each day.    Eat whole-grain bread, whole-wheat pasta and brown rice instead of white grains and rice.    Talk to your provider about Calcium and Vitamin D.     Lifestyle    Exercise at least 150 minutes a week (30 minutes a day, 5 days of the week). This will help you control your weight and prevent disease.    Limit alcohol to one drink per day.    No smoking.     Wear sunscreen to prevent skin cancer.    See your dentist every six months for an exam and cleaning.            Follow-ups after  "your visit        Who to contact     If you have questions or need follow up information about today's clinic visit or your schedule please contact [facility] directly at [phone].  Normal or non-critical lab and imaging results will be communicated to you by MyChart, letter or phone within 4 business days after the clinic has received the results. If you do not hear from us within 7 days, please contact the clinic through Emailagehart or phone. If you have a critical or abnormal lab result, we will notify you by phone as soon as possible.  Submit refill requests through [facility] or call your pharmacy and they will forward the refill request to us. Please allow 3 business days for your refill to be completed.          Additional Information About Your Visit        MyChart Information     [facility] gives you secure access to your electronic health record. If you see a primary care provider, you can also send messages to your care team and make appointments. If you have questions, please call your primary care clinic.  If you do not have a primary care provider, please call [phone] and they will assist you.        Care EveryWhere ID     This is your Care EveryWhere ID. This could be used by other organizations to access your [place] medical records  [id number]        Your Vitals Were     Pulse Temperature Respirations Height Last Period Pulse Oximetry    71 98  F (36.7  C) (Oral) 15 5' 8.75\" (1.746 m) 02/28/2018 (Exact Date) 97%    BMI (Body Mass Index)                   18.98 kg/m2            Blood Pressure from Last 3 Encounters:   03/07/18 112/60   01/16/18 112/60   01/02/17 118/60    Weight from Last 3 Encounters:   03/07/18 127 lb 9.6 oz (57.9 kg)   01/16/18 128 lb 9.6 oz (58.3 kg)   01/02/17 123 lb 11.2 oz (56.1 kg)              Today, you had the following     No orders found for display         Today's Medication Changes          These changes are accurate as of 3/7/18  4:28 PM.  If you " have any questions, ask your nurse or doctor.               These medicines have changed or have updated prescriptions.        Dose/Directions    ethynodiol-ethinyl estradiol 1-35 MG-MCG per tablet   Commonly known as:  ZOVIA 1/35E (28)   This may have changed:  See the new instructions.   Used for:  Menorrhagia with regular cycle, Oral contraceptive pill surveillance   Changed by:  Lilian Santiago MD        Dose:  1 tablet   Take 1 tablet by mouth daily   Quantity:  84 tablet   Refills:  3            Where to get your medicines      These medications were sent to [facility] [address] [facility] [address] [place] [address] & [facility]  [address], [facility] [address]     Phone:  [phone]     ethynodiol-ethinyl estradiol 1-35 MG-MCG per tablet                Primary Care Provider Office Phone # Fax #    Lilian Santiago [phone] [address]  [facility] [address]        Equal Access to Services     [facility]: Hadii aad ku hadasho Soomaali, waaxda luqadaha, qaybta kaalmada adeegyada, waxay idiin hayaan elliot kharash padmini . So [facility] [place] [phone].    ATENCIÓN: Si habla español, tiene a nelson disposición servicios gratuitos de asistencia lingüística. Llame al [phone].    We comply with applicable federal civil rights laws and [place] laws. We do not discriminate on the basis of race, color, national origin, age, disability, sex, sexual orientation, or gender identity.            Thank you!     Thank you for choosing [facility]  for your care. Our goal is always to provide you with excellent care. Hearing back from our patients is one way we can continue to improve our services. Please take a few minutes to complete the written survey that you may receive in the mail after your visit with us. Thank you!             Your Updated Medication List - Protect others around you: Learn how to safely use, store and throw  away your medicines at www.disposemymeds.org.          This list is accurate as of 3/7/18  4:28 PM.  Always use your most recent med list.                   Brand Name Dispense Instructions for use Diagnosis    cetirizine 10 MG tablet    zyrTEC     Take 10 mg by mouth daily as needed for allergies        ethynodiol-ethinyl estradiol 1-35 MG-MCG per tablet    ZOVIA 1/35E (28)    84 tablet    Take 1 tablet by mouth daily    Menorrhagia with regular cycle, Oral contraceptive pill surveillance       FLONASE 50 MCG/ACT spray   Generic drug:  fluticasone     1 Package    Spray 1-2 sprays into both nostrils daily as needed for rhinitis or allergies    Nasal discharge, Seasonal allergies

## 2018-12-22 DIAGNOSIS — Z30.41 ORAL CONTRACEPTIVE PILL SURVEILLANCE: ICD-10-CM

## 2018-12-22 DIAGNOSIS — N92.0 MENORRHAGIA WITH REGULAR CYCLE: ICD-10-CM

## 2018-12-22 NOTE — TELEPHONE ENCOUNTER
"Requested Prescriptions   Pending Prescriptions Disp Refills     ZOVIA 1/35E, 28, 1-35 MG-MCG tablet [Pharmacy Med Name: ZOVIA 1/35 TABLETS 28S]  Last Written Prescription Date:  3/7/18  Last Fill Quantity: 84,  # refills: 3   Last office visit: 3/7/2018 with prescribing provider:  3/7/18   Future Office Visit:     84 tablet 0     Sig: TAKE 1 TABLET BY MOUTH DAILY    Contraceptives Protocol Passed - 12/22/2018 10:03 AM       Passed - Patient is not a current smoker if age is 35 or older       Passed - Recent (12 mo) or future (30 days) visit within the authorizing provider's specialty    Patient had office visit in the last 12 months or has a visit in the next 30 days with authorizing provider or within the authorizing provider's specialty.  See \"Patient Info\" tab in inbasket, or \"Choose Columns\" in Meds & Orders section of the refill encounter.             Passed - No active pregnancy on record       Passed - No positive pregnancy test in past 12 months          "

## 2018-12-23 RX ORDER — ETHYNODIOL DIACETATE AND ETHINYL ESTRADIOL 1 MG-35MCG
KIT ORAL
Qty: 84 TABLET | Refills: 0 | Status: SHIPPED | OUTPATIENT
Start: 2018-12-23 | End: 2019-02-28

## 2018-12-23 NOTE — TELEPHONE ENCOUNTER
Prescription approved per [facility], [facility] or [facility] refill protocol.  Petrona CRAFT RN - Triage  [facility]

## 2019-02-28 DIAGNOSIS — N92.0 MENORRHAGIA WITH REGULAR CYCLE: ICD-10-CM

## 2019-02-28 DIAGNOSIS — Z30.41 ORAL CONTRACEPTIVE PILL SURVEILLANCE: ICD-10-CM

## 2019-03-01 NOTE — TELEPHONE ENCOUNTER
"Requested Prescriptions   Pending Prescriptions Disp Refills     ethynodiol-ethinyl estradiol (KELNOR) 1-35 MG-MCG tablet [Pharmacy Med Name: ETHYNODIOL-ETH EST 1/35 TABLETS 28S]  Last Written Prescription Date:  12/23/18  Last Fill Quantity: 84,  # refills: 0   Last office visit: 3/7/2018 with prescribing provider:  Lilian Santiago MD   Future Office Visit:     84 tablet 0     Sig: TAKE 1 TABLET BY MOUTH DAILY    Contraceptives Protocol Passed - 2/28/2019  8:10 PM       Passed - Patient is not a current smoker if age is 35 or older       Passed - Recent (12 mo) or future (30 days) visit within the authorizing provider's specialty    Patient had office visit in the last 12 months or has a visit in the next 30 days with authorizing provider or within the authorizing provider's specialty.  See \"Patient Info\" tab in inbasket, or \"Choose Columns\" in Meds & Orders section of the refill encounter.             Passed - Medication is active on med list       Passed - No active pregnancy on record       Passed - No positive pregnancy test in past 12 months          "

## 2019-03-04 RX ORDER — ETHYNODIOL DIACETATE AND ETHINYL ESTRADIOL 1 MG-35MCG
KIT ORAL
Qty: 28 TABLET | Refills: 0 | Status: SHIPPED | OUTPATIENT
Start: 2019-03-04 | End: 2019-03-25

## 2019-03-04 NOTE — TELEPHONE ENCOUNTER
Medication is being filled for 1 time refill only due to:  Patient needs to be seen because due for physical this month.   Abby LOPEZ RN

## 2019-03-25 DIAGNOSIS — N92.0 MENORRHAGIA WITH REGULAR CYCLE: ICD-10-CM

## 2019-03-25 DIAGNOSIS — Z30.41 ORAL CONTRACEPTIVE PILL SURVEILLANCE: ICD-10-CM

## 2019-03-26 RX ORDER — ETHYNODIOL DIACETATE AND ETHINYL ESTRADIOL 1 MG-35MCG
KIT ORAL
Qty: 28 TABLET | Refills: 0 | Status: SHIPPED | OUTPATIENT
Start: 2019-03-26 | End: 2019-04-08

## 2019-03-26 NOTE — TELEPHONE ENCOUNTER
Medication is being filled for 1 time refill only due to:  Pt is scheduled for office visit with PCP on 4/8/19. 1-month-supply given to get pt by until office visit.   Prescription approved per [facility] Refill Protocol.    Judy Samuels RN

## 2019-04-08 ENCOUNTER — OFFICE VISIT (OUTPATIENT)
Dept: FAMILY MEDICINE | Facility: CLINIC | Age: 34
End: 2019-04-08
Payer: COMMERCIAL

## 2019-04-08 VITALS
BODY MASS INDEX: 18.08 KG/M2 | OXYGEN SATURATION: 100 % | SYSTOLIC BLOOD PRESSURE: 102 MMHG | HEART RATE: 64 BPM | HEIGHT: 69 IN | TEMPERATURE: 98.2 F | WEIGHT: 122.1 LBS | DIASTOLIC BLOOD PRESSURE: 70 MMHG

## 2019-04-08 DIAGNOSIS — N92.0 MENORRHAGIA WITH REGULAR CYCLE: ICD-10-CM

## 2019-04-08 DIAGNOSIS — Z30.41 ORAL CONTRACEPTIVE PILL SURVEILLANCE: ICD-10-CM

## 2019-04-08 DIAGNOSIS — Z23 NEED FOR VACCINATION: ICD-10-CM

## 2019-04-08 DIAGNOSIS — Z00.00 ROUTINE HISTORY AND PHYSICAL EXAMINATION OF ADULT: Primary | ICD-10-CM

## 2019-04-08 PROCEDURE — 90471 IMMUNIZATION ADMIN: CPT | Performed by: INTERNAL MEDICINE

## 2019-04-08 PROCEDURE — 99395 PREV VISIT EST AGE 18-39: CPT | Mod: 25 | Performed by: INTERNAL MEDICINE

## 2019-04-08 PROCEDURE — 90715 TDAP VACCINE 7 YRS/> IM: CPT | Performed by: INTERNAL MEDICINE

## 2019-04-08 RX ORDER — ETHYNODIOL DIACETATE AND ETHINYL ESTRADIOL 1 MG-35MCG
1 KIT ORAL DAILY
Qty: 84 TABLET | Refills: 3 | Status: SHIPPED | OUTPATIENT
Start: 2019-04-08 | End: 2020-01-23

## 2019-04-08 ASSESSMENT — ENCOUNTER SYMPTOMS
ARTHRALGIAS: 0
PARESTHESIAS: 0
NERVOUS/ANXIOUS: 0
SORE THROAT: 0
BREAST MASS: 0
SHORTNESS OF BREATH: 0
JOINT SWELLING: 0
DIARRHEA: 0
FEVER: 0
HEMATURIA: 0
MYALGIAS: 0
DIZZINESS: 0
DYSURIA: 0
WEAKNESS: 0
HEMATOCHEZIA: 0
CONSTIPATION: 0
HEARTBURN: 0
HEADACHES: 0
PALPITATIONS: 0
CHILLS: 0
COUGH: 0
EYE PAIN: 0
ABDOMINAL PAIN: 0
NAUSEA: 0
FREQUENCY: 0

## 2019-04-08 ASSESSMENT — MIFFLIN-ST. JEOR: SCORE: 1324.25

## 2019-04-08 NOTE — PROGRESS NOTES
Chief Complaint   Patient presents with     Physical        SUBJECTIVE:   CC: Nabila Gee is an 33 year old woman who presents for preventive health visit.     Healthy Habits:     Getting at least 3 servings of Calcium per day:  Yes    Bi-annual eye exam:  Yes    Dental care twice a year:  Yes    Sleep apnea or symptoms of sleep apnea:  None    Diet:  Regular (no restrictions)    Frequency of exercise:  2-3 days/week    Duration of exercise:  15-30 minutes    Taking medications regularly:  Yes    Medication side effects:  None    PHQ-2 Total Score: 0    Additional concerns today:  No        Today's PHQ-2 Score:   PHQ-2 ( 1999 [employer]) 4/8/2019   Q1: Little interest or pleasure in doing things 0   Q2: Feeling down, depressed or hopeless 0   PHQ-2 Score 0   Q1: Little interest or pleasure in doing things Not at all   Q2: Feeling down, depressed or hopeless Not at all   PHQ-2 Score 0       Abuse: Current or Past(Physical, Sexual or Emotional)- No  Do you feel safe in your environment? Yes    Social History     Tobacco Use     Smoking status: Former Smoker     Packs/day: 1.00     Years: 4.00     Pack years: 4.00     Smokeless tobacco: Never Used   Substance Use Topics     Alcohol use: Yes     Alcohol/week: 2.5 oz     Types: 5 Standard drinks or equivalent per week     Comment: 1 glass of wine daily         Alcohol Use 4/8/2019   Prescreen: >3 drinks/day or >7 drinks/week? Yes   Prescreen: >3 drinks/day or >7 drinks/week? -   AUDIT SCORE  7       Reviewed orders with patient.  Reviewed health maintenance and updated orders accordingly - Yes  Labs reviewed in EPIC  BP Readings from Last 3 Encounters:   04/08/19 102/70   03/07/18 112/60   01/16/18 112/60    Wt Readings from Last 3 Encounters:   04/08/19 55.4 kg (122 lb 1.6 oz)   03/07/18 57.9 kg (127 lb 9.6 oz)   01/16/18 58.3 kg (128 lb 9.6 oz)                  Patient Active Problem List   Diagnosis     CARDIOVASCULAR SCREENING; LDL GOAL LESS THAN 160     SIADH  (syndrome of inappropriate ADH production) (H)     Benign neoplasm of skin     Family history of malignant melanoma of skin     Onychomycosis     History of abnormal Pap smear     Excessive or frequent menstruation     Past Surgical History:   Procedure Laterality Date     DILATION AND CURETTAGE, HYSTEROSCOPY DIAGNOSTIC, COMBINED N/A 2/12/2016    Procedure: COMBINED DILATION AND CURETTAGE, HYSTEROSCOPY DIAGNOSTIC;  Surgeon: Aj Clemons MD;  Location: [facility] OR     SURGICAL HISTORY OF -   4/10    colposcopy; mild dysplasia; no treatment.     SURGICAL HISTORY OF -   1/11    colposcopy; recommended repeat       Social History     Tobacco Use     Smoking status: Former Smoker     Packs/day: 1.00     Years: 4.00     Pack years: 4.00     Smokeless tobacco: Never Used   Substance Use Topics     Alcohol use: Yes     Alcohol/week: 2.5 oz     Types: 5 Standard drinks or equivalent per week     Comment: 1 glass of wine daily     Family History   Problem Relation Age of Onset     Cancer Mother         melanoma     Heart Disease Mother         murmur     C.A.D. Paternal Grandmother         valve replacement     Breast Cancer Paternal Grandmother      C.A.D. Paternal Grandfather         triple bypass     Prostate Cancer Paternal Grandfather      Diabetes Other         mom's side of family; not immediate         Current Outpatient Medications   Medication Sig Dispense Refill     cetirizine (ZYRTEC) 10 MG tablet Take 10 mg by mouth daily as needed for allergies       ethynodiol-ethinyl estradiol (KELNOR) 1-35 MG-MCG tablet Take 1 tablet by mouth daily 84 tablet 3     fluticasone (FLONASE) 50 MCG/ACT nasal spray Spray 1-2 sprays into both nostrils daily as needed for rhinitis or allergies 1 Package 11     No Known Allergies    Mammogram not appropriate for this patient based on age.    Pertinent mammograms are reviewed under the imaging tab.  History of abnormal Pap smear: NO - age 30- 65 PAP every 3 years recommended  PAP / HPV  "Latest Ref Rng & Units 1/2/2017 3/27/2013 4/17/2012   PAP - NIL NIL NIL   HPV 16 DNA NEG Negative - -   HPV 18 DNA NEG Negative - -   OTHER HR HPV NEG Negative - -     Reviewed and updated as needed this visit by clinical staff         Reviewed and updated as needed this visit by Provider        Past Medical History:   Diagnosis Date     LSIL (low grade squamous intraepithelial lesion) on Pap smear 2009, 2010    STEPHANIE I on colp 4/2010     SIADH (syndrome of inappropriate ADH production) (H) 9/11    hospitalized. will see Endocrinologist      Past Surgical History:   Procedure Laterality Date     DILATION AND CURETTAGE, HYSTEROSCOPY DIAGNOSTIC, COMBINED N/A 2/12/2016    Procedure: COMBINED DILATION AND CURETTAGE, HYSTEROSCOPY DIAGNOSTIC;  Surgeon: Aj Clemons MD;  Location:  OR     SURGICAL HISTORY OF -   4/10    colposcopy; mild dysplasia; no treatment.     SURGICAL HISTORY OF -   1/11    colposcopy; recommended repeat       Review of Systems   Constitutional: Negative for chills and fever.   HENT: Negative for congestion, ear pain, hearing loss and sore throat.    Eyes: Negative for pain and visual disturbance.   Respiratory: Negative for cough and shortness of breath.    Cardiovascular: Negative for chest pain, palpitations and peripheral edema.   Gastrointestinal: Negative for abdominal pain, constipation, diarrhea, heartburn, hematochezia and nausea.   Breasts:  Negative for tenderness, breast mass and discharge.   Genitourinary: Negative for dysuria, frequency, genital sores, hematuria, pelvic pain, urgency, vaginal bleeding and vaginal discharge.   Musculoskeletal: Negative for arthralgias, joint swelling and myalgias.   Skin: Negative for rash.   Neurological: Negative for dizziness, weakness, headaches and paresthesias.   Psychiatric/Behavioral: Negative for mood changes. The patient is not nervous/anxious.           OBJECTIVE:   Ht 1.746 m (5' 8.75\")   BMI 18.98 kg/m    Physical Exam  GENERAL: " "healthy, alert and no distress  HENT: ear canals and TM's normal, nose and mouth without ulcers or lesions  NECK: no adenopathy, no asymmetry, masses, or scars and thyroid normal to palpation  RESP: lungs clear to auscultation - no rales, rhonchi or wheezes  BREAST: normal without masses, tenderness or nipple discharge and no palpable axillary masses or adenopathy  CV: regular rates and rhythm, normal S1 S2, no S3 or S4, no murmur, click or rub, peripheral pulses strong and no peripheral edema  ABDOMEN: soft, nontender, no hepatosplenomegaly, no masses and bowel sounds normal  MS: no gross musculoskeletal defects noted, no edema  SKIN: no suspicious lesions or rashes  NEURO: Normal strength and tone, mentation intact and speech normal  PSYCH: mentation appears normal, affect normal/bright        ASSESSMENT/PLAN:   (Z00.00) Routine history and physical examination of adult  (primary encounter diagnosis)  Comment: HEALTH CARE MAINTENANCE reviewed;   Lab Results   Component Value Date    PAP NIL 01/02/2017       Plan: PAP due 2020    (N92.0) Menorrhagia with regular cycle  Comment: OCP helps to regulate Cycles  Plan: ethynodiol-ethinyl estradiol (KELNOR) 1-35         MG-MCG tablet          (Z30.41) Oral contraceptive pill surveillance  Comment: helps to regulate cycle  Plan: ethynodiol-ethinyl estradiol (KELNOR) 1-35         MG-MCG tablet          (Z23) Need for vaccination  Comment: update TDAP  Plan: ADMIN 1st VACCINE           COUNSELING:  Reviewed preventive health counseling, as reflected in patient instructions       Regular exercise       Healthy diet/nutrition       Immunizations    Vaccinated for: TDAP             Contraception    BP Readings from Last 1 Encounters:   03/07/18 112/60     Estimated body mass index is 18.98 kg/m  as calculated from the following:    Height as of this encounter: 1.746 m (5' 8.75\").    Weight as of 3/7/18: 57.9 kg (127 lb 9.6 oz).           reports that she has quit smoking. She " has a 4.00 pack-year smoking history. She has never used smokeless tobacco.      Counseling Resources:  ATP IV Guidelines  Pooled Cohorts Equation Calculator  Breast Cancer Risk Calculator  FRAX Risk Assessment  ICSI Preventive Guidelines  Dietary Guidelines for Americans, 2010  USDA's MyPlate  ASA Prophylaxis  Lung CA Screening    Lilian Santiago MD  Internal Medicine   [facility]

## 2019-11-09 ENCOUNTER — HEALTH MAINTENANCE LETTER (OUTPATIENT)
Age: 34
End: 2019-11-09

## 2020-01-22 DIAGNOSIS — Z30.41 ORAL CONTRACEPTIVE PILL SURVEILLANCE: ICD-10-CM

## 2020-01-22 DIAGNOSIS — N92.0 MENORRHAGIA WITH REGULAR CYCLE: ICD-10-CM

## 2020-01-23 RX ORDER — ETHYNODIOL DIACETATE AND ETHINYL ESTRADIOL 1 MG-35MCG
KIT ORAL
Qty: 84 TABLET | Refills: 0 | Status: SHIPPED | OUTPATIENT
Start: 2020-01-23 | End: 2020-03-18

## 2020-01-23 NOTE — TELEPHONE ENCOUNTER
Called pharmacy and patient has no refills left. Last  11/8/2019.    Prescription approved per [facility].    Sabrina Araya RN

## 2020-01-23 NOTE — TELEPHONE ENCOUNTER
"Requested Prescriptions   Pending Prescriptions Disp Refills     KELNOR 1/35 1-35 MG-MCG tablet [Pharmacy Med Name: KELNOR 1/35 1-35MG-MCG TABS] 84 tablet 3     Sig: TAKE ONE TABLET BY MOUTH ONCE DAILY   Last Written Prescription Date:  4/8/19  Last Fill Quantity: 84,  # refills: 3   Last office visit: 4/8/2019 with prescribing provider:  Lilian Santiago MD   Future Office Visit:        Contraceptives Protocol Passed - 1/22/2020  5:55 PM        Passed - Patient is not a current smoker if age is 35 or older        Passed - Recent (12 mo) or future (30 days) visit within the authorizing provider's specialty     Patient has had an office visit with the authorizing provider or a provider within the authorizing providers department within the previous 12 mos or has a future within next 30 days. See \"Patient Info\" tab in inbasket, or \"Choose Columns\" in Meds & Orders section of the refill encounter.              Passed - Medication is active on med list        Passed - No active pregnancy on record        Passed - No positive pregnancy test in past 12 months         "

## 2020-03-18 DIAGNOSIS — Z30.41 ORAL CONTRACEPTIVE PILL SURVEILLANCE: ICD-10-CM

## 2020-03-18 DIAGNOSIS — N92.0 MENORRHAGIA WITH REGULAR CYCLE: ICD-10-CM

## 2020-03-18 NOTE — TELEPHONE ENCOUNTER
Pending Prescriptions:                       Disp   Refills    ethynodiol-ethinyl estradiol (KELNOR 1/35*84 tab*0            Sig: Take 1 tablet by mouth daily    Pt asking for refill.  Pt ph# [phone], with questions.   Thank you.  den

## 2020-03-19 RX ORDER — ETHYNODIOL DIACETATE AND ETHINYL ESTRADIOL 1 MG-35MCG
1 KIT ORAL DAILY
Qty: 84 TABLET | Refills: 0 | Status: SHIPPED | OUTPATIENT
Start: 2020-03-19 | End: 2020-06-04

## 2020-03-19 NOTE — TELEPHONE ENCOUNTER
"Requested Prescriptions   Pending Prescriptions Disp Refills     ethynodiol-ethinyl estradiol (KELNOR 1/35) 1-35 MG-MCG tablet 84 tablet 0     Sig: Take 1 tablet by mouth daily   Last Written Prescription Date:  1/23/20  Last Fill Quantity: 84,  # refills: 0   Last office visit: 4/8/2019 with prescribing provider:  Lilian Santiago MD    Future Office Visit:        Contraceptives Protocol Passed - 3/18/2020  3:21 PM        Passed - Patient is not a current smoker if age is 35 or older        Passed - Recent (12 mo) or future (30 days) visit within the authorizing provider's specialty     Patient has had an office visit with the authorizing provider or a provider within the authorizing providers department within the previous 12 mos or has a future within next 30 days. See \"Patient Info\" tab in inbasket, or \"Choose Columns\" in Meds & Orders section of the refill encounter.              Passed - Medication is active on med list        Passed - No active pregnancy on record        Passed - No positive pregnancy test in past 12 months            "

## 2020-05-14 ENCOUNTER — OFFICE VISIT (OUTPATIENT)
Dept: FAMILY MEDICINE | Facility: CLINIC | Age: 35
End: 2020-05-14
Payer: COMMERCIAL

## 2020-05-14 VITALS
BODY MASS INDEX: 19.63 KG/M2 | OXYGEN SATURATION: 100 % | RESPIRATION RATE: 15 BRPM | TEMPERATURE: 98.4 F | HEIGHT: 68 IN | WEIGHT: 129.5 LBS

## 2020-05-14 DIAGNOSIS — R42 DIZZINESS: Primary | ICD-10-CM

## 2020-05-14 LAB
BASOPHILS # BLD AUTO: 0 10E9/L (ref 0–0.2)
BASOPHILS NFR BLD AUTO: 0.5 %
DIFFERENTIAL METHOD BLD: NORMAL
EOSINOPHIL # BLD AUTO: 0.1 10E9/L (ref 0–0.7)
EOSINOPHIL NFR BLD AUTO: 1.3 %
ERYTHROCYTE [DISTWIDTH] IN BLOOD BY AUTOMATED COUNT: 12.1 % (ref 10–15)
HCT VFR BLD AUTO: 42.3 % (ref 35–47)
HGB BLD-MCNC: 13.5 G/DL (ref 11.7–15.7)
LYMPHOCYTES # BLD AUTO: 1.8 10E9/L (ref 0.8–5.3)
LYMPHOCYTES NFR BLD AUTO: 28.5 %
MCH RBC QN AUTO: 31 PG (ref 26.5–33)
MCHC RBC AUTO-ENTMCNC: 31.9 G/DL (ref 31.5–36.5)
MCV RBC AUTO: 97 FL (ref 78–100)
MONOCYTES # BLD AUTO: 0.4 10E9/L (ref 0–1.3)
MONOCYTES NFR BLD AUTO: 6.9 %
NEUTROPHILS # BLD AUTO: 3.9 10E9/L (ref 1.6–8.3)
NEUTROPHILS NFR BLD AUTO: 62.8 %
PLATELET # BLD AUTO: 209 10E9/L (ref 150–450)
RBC # BLD AUTO: 4.36 10E12/L (ref 3.8–5.2)
WBC # BLD AUTO: 6.2 10E9/L (ref 4–11)

## 2020-05-14 PROCEDURE — 80050 GENERAL HEALTH PANEL: CPT | Performed by: INTERNAL MEDICINE

## 2020-05-14 PROCEDURE — 99214 OFFICE O/P EST MOD 30 MIN: CPT | Performed by: INTERNAL MEDICINE

## 2020-05-14 PROCEDURE — 36415 COLL VENOUS BLD VENIPUNCTURE: CPT | Performed by: INTERNAL MEDICINE

## 2020-05-14 ASSESSMENT — MIFFLIN-ST. JEOR: SCORE: 1344.88

## 2020-05-14 NOTE — PROGRESS NOTES
Subjective     Nabila Gee is a 34 year old female who presents to clinic today for the following health issues:    HPI   Dizziness/ Vertigo      Duration: about a week    Description   Feeling faint:  YES- 1 time did feel faint and was stretching before bed and had to catch herself on the wall.   Feeling like the surroundings are moving: no   Loss of consciousness or falls: no     Intensity:  mild, moderate    Accompanying signs and symptoms:   Nausea/vomitting: no   Palpitations: no   Weakness in arms or legs: YES- states slight weakness noted  Vision or speech changes: YES- tunnel vision times 1 and that was the night she was stretching   Ringing in ears (Tinnitus): no   Hearing loss related to dizziness: no   Other (fevers/chills/sweating/dyspnea): no     History (similar episodes/head trauma/previous evaluation/recent bleeding): None    Precipitating or alleviating factors (new meds/chemicals): None  Worse with activity/head movement: YES- at times but not always     Therapies tried and outcome: None        Patient Active Problem List   Diagnosis     CARDIOVASCULAR SCREENING; LDL GOAL LESS THAN 160     SIADH (syndrome of inappropriate ADH production) (H)     Benign neoplasm of skin     Family history of malignant melanoma of skin     Onychomycosis     History of abnormal Pap smear     Excessive or frequent menstruation     Past Surgical History:   Procedure Laterality Date     DILATION AND CURETTAGE, HYSTEROSCOPY DIAGNOSTIC, COMBINED N/A 2/12/2016    Procedure: COMBINED DILATION AND CURETTAGE, HYSTEROSCOPY DIAGNOSTIC;  Surgeon: Aj Clemons MD;  Location:  OR     SURGICAL HISTORY OF -   4/10    colposcopy; mild dysplasia; no treatment.     SURGICAL HISTORY OF -   1/11    colposcopy; recommended repeat       Social History     Tobacco Use     Smoking status: Former Smoker     Packs/day: 1.00     Years: 4.00     Pack years: 4.00     Smokeless tobacco: Never Used   Substance Use Topics     Alcohol use:  Yes     Alcohol/week: 4.2 standard drinks     Types: 5 Standard drinks or equivalent per week     Comment: 1 glass of wine daily     Family History   Problem Relation Age of Onset     Cancer Mother         melanoma     Heart Disease Mother         murmur     Aneurysm Father         brain calcified aneurysm     C.A.D. Paternal Grandmother         valve replacement     Breast Cancer Paternal Grandmother      C.A.D. Paternal Grandfather         triple bypass     Prostate Cancer Paternal Grandfather      Diabetes Other         mom's side of family; not immediate         Current Outpatient Medications   Medication Sig Dispense Refill     cetirizine (ZYRTEC) 10 MG tablet Take 10 mg by mouth daily as needed for allergies       KELNOR 1/35 1-35 MG-MCG tablet TAKE ONE TABLET BY MOUTH ONCE DAILY 84 tablet 3     No Known Allergies  Recent Labs   Lab Test 05/14/20  1433 01/16/18  1621 04/01/14  0726  07/13/12  0848 04/03/12  1444   LDL  --   --  59  --   --   --    HDL  --   --  56  --   --   --    TRIG  --   --  80  --   --   --    ALT 16  --   --   --  <6 9   CR 0.62  --   --   --   --   --    GFRESTIMATED >90  --   --   --   --   --    GFRESTBLACK >90  --   --   --   --   --    POTASSIUM 3.8  --   --   --   --   --    TSH 1.31 1.83  --    < >  --   --     < > = values in this interval not displayed.      BP Readings from Last 3 Encounters:   04/08/19 102/70   03/07/18 112/60   01/16/18 112/60    Wt Readings from Last 3 Encounters:   05/14/20 58.7 kg (129 lb 8 oz)   04/08/19 55.4 kg (122 lb 1.6 oz)   03/07/18 57.9 kg (127 lb 9.6 oz)             Reviewed and updated as needed this visit by Provider  Tobacco  Allergies  Meds  Problems  Med Hx  Surg Hx  Fam Hx         Review of Systems   CONSTITUTIONAL: NEGATIVE for fever, chills, change in weight  ENT/MOUTH: NEGATIVE for ear, mouth and throat problems  RESP: NEGATIVE for significant cough or SOB  CV: NEGATIVE for chest pain, palpitations or peripheral edema  GI: NEGATIVE  "for nausea, abdominal pain, heartburn, or change in bowel habits  NEURO: no headaches, weakness or loss of consciousness  PSYCHIATRIC: NEGATIVE for changes in mood or affect      Objective    Temp 98.4  F (36.9  C) (Oral)   Resp 15   Ht 1.734 m (5' 8.25\")   Wt 58.7 kg (129 lb 8 oz)   LMP 02/13/2020 (Approximate)   SpO2 100%   BMI 19.55 kg/m    Body mass index is 19.55 kg/m .  Physical Exam   GENERAL: healthy, alert and no distress  EYES: Eyes grossly normal to inspection, conjunctivae and sclerae normal and nystagmus:  None, normal fundoscopic exam  HENT: ear canals and TM's normal, nose and mouth without ulcers or lesions  NECK: no adenopathy, no asymmetry, masses, or scars and thyroid normal to palpation  RESP: lungs clear to auscultation - no rales, rhonchi or wheezes  CV: regular rate and rhythm, normal S1 S2, no S3 or S4, no murmur, click or rub, no peripheral edema and peripheral pulses strong  ABDOMEN: soft, nontender, no hepatosplenomegaly, no masses and bowel sounds normal  MS: no gross musculoskeletal defects noted, no edema  NEURO: Normal strength and tone, mentation intact and speech normal; cranial nerve exam intact  PSYCH: mentation appears normal, affect normal/bright    Diagnostic Test Results:  Labs reviewed in Epic        Assessment & Plan     (R42) Dizziness  (primary encounter diagnosis)  Comment: one brief episode. hx of low sodium   Clinically OK  Plan: Comprehensive metabolic panel (BMP + Alb, Alk         Phos, ALT, AST, Total. Bili, TP), TSH with free        T4 reflex, CBC with platelets and differential        Keep well hydrated; change positions slowly from laying loreto to sitting to standing. May use Meclizine if symptoms recur. asess electrolytes since she has had low sodium in the past.            Return in about 1 week (around 5/21/2020) for if symptoms worsen.    Lilian Santiago MD  Internal Medicine   [facility]    "

## 2020-05-15 LAB
ALBUMIN SERPL-MCNC: 4 G/DL (ref 3.4–5)
ALP SERPL-CCNC: 36 U/L (ref 40–150)
ALT SERPL W P-5'-P-CCNC: 16 U/L (ref 0–50)
ANION GAP SERPL CALCULATED.3IONS-SCNC: 6 MMOL/L (ref 3–14)
AST SERPL W P-5'-P-CCNC: 15 U/L (ref 0–45)
BILIRUB SERPL-MCNC: 0.5 MG/DL (ref 0.2–1.3)
BUN SERPL-MCNC: 9 MG/DL (ref 7–30)
CALCIUM SERPL-MCNC: 8.5 MG/DL (ref 8.5–10.1)
CHLORIDE SERPL-SCNC: 106 MMOL/L (ref 94–109)
CO2 SERPL-SCNC: 25 MMOL/L (ref 20–32)
CREAT SERPL-MCNC: 0.62 MG/DL (ref 0.52–1.04)
GFR SERPL CREATININE-BSD FRML MDRD: >90 ML/MIN/{1.73_M2}
GLUCOSE SERPL-MCNC: 85 MG/DL (ref 70–99)
POTASSIUM SERPL-SCNC: 3.8 MMOL/L (ref 3.4–5.3)
PROT SERPL-MCNC: 7.6 G/DL (ref 6.8–8.8)
SODIUM SERPL-SCNC: 137 MMOL/L (ref 133–144)
TSH SERPL DL<=0.005 MIU/L-ACNC: 1.31 MU/L (ref 0.4–4)

## 2020-06-01 ENCOUNTER — VIRTUAL VISIT (OUTPATIENT)
Dept: FAMILY MEDICINE | Facility: CLINIC | Age: 35
End: 2020-06-01
Payer: COMMERCIAL

## 2020-06-01 DIAGNOSIS — M54.50 ACUTE MIDLINE LOW BACK PAIN WITHOUT SCIATICA: Primary | ICD-10-CM

## 2020-06-01 PROCEDURE — 99213 OFFICE O/P EST LOW 20 MIN: CPT | Mod: TEL | Performed by: PHYSICIAN ASSISTANT

## 2020-06-01 NOTE — PROGRESS NOTES
"Nabila Gee is a 34 year old female who is being evaluated via a billable telephone visit.      The patient has been notified of following:     \"This telephone visit will be conducted via a call between you and your physician/provider. We have found that certain health care needs can be provided without the need for a physical exam.  This service lets us provide the care you need with a short phone conversation.  If a prescription is necessary we can send it directly to your pharmacy.  If lab work is needed we can place an order for that and you can then stop by our lab to have the test done at a later time.    If during the course of the call the physician/provider feels a telephone visit is not appropriate, you will not be charged for this service.\"     Consent has been obtained for this service by 1 care team member: yes. See the scanned image in the medical record.    Nabila Gee complains of    Chief Complaint   Patient presents with     Back Pain       I have reviewed and updated the patient's Past Medical History, Social History, Family History and Medication List.    ALLERGIES  Patient has no known allergies.    Oumou Darnell CMA (MA signature)    Additional provider notes: Back Pain  Duration of complaint: Started yesterday morning, Tried resting, Ibuprofen but has gotten worse through out the day.     Developed back pain yesterday morning. She was carrying heavy planters from [employer] and noticed back felt sore after she got home, progressively worsening since then. Aching pain in mid low back without radiation. Pain seems better from yesterday, was rated 8/10 yesterday, currently 5/10. Pain worse with movements, specifically with bending forward. She took 800 mg ibuprofen yesterday x1 and this morning x1 with some improvement. No leg pain, leg numbness/tingling, saddle anesthesia, leg weakness, loss of bowel or bladder control, urinary retention. No fever. No recent illness. No abdominal pain, " nausea, vomiting, diarrhea, urinary changes.    She was seen in 1/2018 for similar back pain, lumbar sprain and paraspinal muscle spasm. At that time, was treated with Naproxen 500 mg BID and Flexeril 10 mg TID as needed. Referred to physical therapy but did not follow-up with them.    Specific cause: None  Description:   Location of pain: low back middle  Character of pain: dull ache  Pain radiation:none  Intensity: moderate, severe  Accompanying Signs & Symptoms:  Fever: no   Numbness or weakness in legs:  no   Dysuria or Hematuria: no   Bowel or bladder incontinence: no   History:   Any injury (lifting, bending, twisting): YES- lifted heavy planters yesterday, no injury  Work Injury: no  History of back problems: see above  Any previous MRI or X-rays: no  Any history of back surgery: no   Any cancer history: no   Precipitating factors:   Worsened by: Bending.  Alleviating factors:  Improved by: ibuprofen  Therapies Tried and outcome: NSAIDS    There were no vitals taken for this visit.  Physical Exam      GENERAL: No apparent distress  RESP: No audible wheeze, cough  PSYCH: Mentation appears normal, affect normal/bright.  Remainder of physical exam unable to be completed via telephone    Assessment/Plan:  1. Acute midline low back pain without sciatica  - [facility]   Likely inflammation. No red flag symptoms. Will treat conservatively. Recommend ibuprofen 600 mg every 6 hours as needed for pain, inflammation. Ice/heat. Referral provided for physical therapy. Follow-up urgently if any red flag symptoms we discussed (worsening pain, pain into legs, numbness/tingling in legs or groin, leg weakness, loss of bowel or bladder control, fever). Follow-up if no improvement. Risks and benefits of treatment plan discussed. Patient and/or parent acknowledges and agrees with plan of care, all questions answered.    I have reviewed the note as documented above.  This accurately captures the  substance of my conversation with the patient.    Total time of call between patient and provider was 12 minutes     RODOLFO RichmondC

## 2020-06-01 NOTE — PATIENT INSTRUCTIONS
Recommend ibuprofen 600 mg every 6 hours as needed for pain, inflammation. Ice/heat. Referral provided for physical therapy. Follow-up urgently if any red flag symptoms we discussed (worsening pain, pain into legs, numbness/tingling in legs or groin, leg weakness, loss of bowel or bladder control, fever).

## 2020-06-14 ENCOUNTER — MYC MEDICAL ADVICE (OUTPATIENT)
Dept: FAMILY MEDICINE | Facility: CLINIC | Age: 35
End: 2020-06-14

## 2020-06-18 ENCOUNTER — OFFICE VISIT (OUTPATIENT)
Dept: FAMILY MEDICINE | Facility: CLINIC | Age: 35
End: 2020-06-18
Payer: COMMERCIAL

## 2020-06-18 VITALS
WEIGHT: 126.4 LBS | RESPIRATION RATE: 15 BRPM | TEMPERATURE: 98.4 F | HEART RATE: 73 BPM | SYSTOLIC BLOOD PRESSURE: 116 MMHG | DIASTOLIC BLOOD PRESSURE: 64 MMHG | BODY MASS INDEX: 19.08 KG/M2 | OXYGEN SATURATION: 100 %

## 2020-06-18 DIAGNOSIS — R42 VERTIGO: Primary | ICD-10-CM

## 2020-06-18 PROCEDURE — 99213 OFFICE O/P EST LOW 20 MIN: CPT | Performed by: INTERNAL MEDICINE

## 2020-06-18 NOTE — PROGRESS NOTES
Subjective     Nabila Gee is a 34 year old female who presents to clinic today for the following health issues:    HPI   Dizziness   Wondering about possible Vertigo and vestibular treatment      Duration: for about 6 weeks    Description   Feeling faint:  no   Feeling like the surroundings are moving: YES  Loss of consciousness or falls: no     Intensity:  mild, moderate    Accompanying signs and symptoms:   Nausea/vomitting: YES- nauseated  Palpitations: no   Weakness in arms or legs: no   Vision or speech changes: no   Ringing in ears (Tinnitus): no   Hearing loss related to dizziness: no   Other (fevers/chills/sweating/dyspnea): no     History (similar episodes/head trauma/previous evaluation/recent bleeding): None    Precipitating or alleviating factors (new meds/chemicals): None  Worse with activity/head movement: YES- any head movement side to side and up and down     Therapies tried and outcome: None    Last Comprehensive Metabolic Panel:  Sodium   Date Value Ref Range Status   05/14/2020 137 133 - 144 mmol/L Final     Potassium   Date Value Ref Range Status   05/14/2020 3.8 3.4 - 5.3 mmol/L Final     Chloride   Date Value Ref Range Status   05/14/2020 106 94 - 109 mmol/L Final     Carbon Dioxide   Date Value Ref Range Status   05/14/2020 25 20 - 32 mmol/L Final     Anion Gap   Date Value Ref Range Status   05/14/2020 6 3 - 14 mmol/L Final     Glucose   Date Value Ref Range Status   05/14/2020 85 70 - 99 mg/dL Final     Urea Nitrogen   Date Value Ref Range Status   05/14/2020 9 7 - 30 mg/dL Final     Creatinine   Date Value Ref Range Status   05/14/2020 0.62 0.52 - 1.04 mg/dL Final     GFR Estimate   Date Value Ref Range Status   05/14/2020 >90 >60 mL/min/[1.73_m2] Final     Comment:     Non  GFR Calc  Starting 12/18/2018, serum creatinine based estimated GFR (eGFR) will be   calculated using the Chronic Kidney Disease Epidemiology Collaboration   (CKD-EPI) equation.       Calcium   Date  Value Ref Range Status   05/14/2020 8.5 8.5 - 10.1 mg/dL Final         Patient Active Problem List   Diagnosis     CARDIOVASCULAR SCREENING; LDL GOAL LESS THAN 160     SIADH (syndrome of inappropriate ADH production) (H)     Benign neoplasm of skin     Family history of malignant melanoma of skin     Onychomycosis     History of abnormal Pap smear     Excessive or frequent menstruation     Past Surgical History:   Procedure Laterality Date     DILATION AND CURETTAGE, HYSTEROSCOPY DIAGNOSTIC, COMBINED N/A 2/12/2016    Procedure: COMBINED DILATION AND CURETTAGE, HYSTEROSCOPY DIAGNOSTIC;  Surgeon: Aj Clemons MD;  Location: [facility] OR     SURGICAL HISTORY OF -   4/10    colposcopy; mild dysplasia; no treatment.     SURGICAL HISTORY OF -   1/11    colposcopy; recommended repeat       Social History     Tobacco Use     Smoking status: Former Smoker     Packs/day: 1.00     Years: 4.00     Pack years: 4.00     Smokeless tobacco: Never Used   Substance Use Topics     Alcohol use: Yes     Alcohol/week: 4.2 standard drinks     Types: 5 Standard drinks or equivalent per week     Comment: 1 glass of wine daily     Family History   Problem Relation Age of Onset     Cancer Mother         melanoma     Heart Disease Mother         murmur     Aneurysm Father         brain calcified aneurysm     C.A.D. Paternal Grandmother         valve replacement     Breast Cancer Paternal Grandmother      C.A.D. Paternal Grandfather         triple bypass     Prostate Cancer Paternal Grandfather      Diabetes Other         mom's side of family; not immediate         Current Outpatient Medications   Medication Sig Dispense Refill     cetirizine (ZYRTEC) 10 MG tablet Take 10 mg by mouth daily as needed for allergies       KELNOR 1/35 1-35 MG-MCG tablet TAKE ONE TABLET BY MOUTH ONCE DAILY 84 tablet 3     scopolamine (TRANSDERM) 1 MG/3DAYS 72 hr patch Place 1 patch onto the skin every 72 hours 4 patch 0     No Known Allergies  Recent Labs   Lab Test  07/07/20  0826 05/14/20  1433 01/16/18  1621 04/01/14  0726  07/13/12  0848   LDL 84  --   --  59  --   --    HDL 60  --   --  56  --   --    TRIG 75  --   --  80  --   --    ALT  --  16  --   --   --  <6   CR  --  0.62  --   --   --   --    GFRESTIMATED  --  >90  --   --   --   --    GFRESTBLACK  --  >90  --   --   --   --    POTASSIUM  --  3.8  --   --   --   --    TSH  --  1.31 1.83  --    < >  --     < > = values in this interval not displayed.      BP Readings from Last 3 Encounters:   07/07/20 104/62   06/18/20 116/64   04/08/19 102/70    Wt Readings from Last 3 Encounters:   07/07/20 56.3 kg (124 lb 1.6 oz)   06/18/20 57.3 kg (126 lb 6.4 oz)   05/14/20 58.7 kg (129 lb 8 oz)                    Reviewed and updated as needed this visit by Provider  Tobacco  Allergies  Meds  Problems  Med Hx  Surg Hx  Fam Hx         Review of Systems   As noted above      Objective    /64   Pulse 73   Temp 98.4  F (36.9  C) (Oral)   Resp 15   Wt 57.3 kg (126 lb 6.4 oz)   SpO2 100%   BMI 19.08 kg/m    Body mass index is 19.08 kg/m .  Physical Exam   GENERAL: healthy, alert and no distress  HENT: normal cephalic/atraumatic, ear canals and TM's normal, nose and mouth without ulcers or lesions, oropharynx clear and oral mucous membranes moist  NECK: no adenopathy, no asymmetry, masses, or scars and thyroid normal to palpation  RESP: lungs clear to auscultation - no rales, rhonchi or wheezes  CV: regular rates and rhythm, normal S1 S2, no S3 or S4, no murmur, click or rub, peripheral pulses strong and no peripheral edema  ABDOMEN: soft, nontender, no hepatosplenomegaly, no masses and bowel sounds normal  MS: no gross musculoskeletal defects noted, no edema  NEURO: Normal strength and tone, sensory exam grossly normal, mentation intact and Cranial nerve exam is negative. Unable to illicit nystagmus   PSYCH: mentation appears normal, affect normal/bright    Diagnostic Test Results:  Labs reviewed in Epic         Assessment & Plan     (R42) Vertigo  (primary encounter diagnosis)  Comment: based on symptoms, concern about BPPV; on exam, was not able to illicit exam findings; given her symptoms, recommend Vestibular rehab evaluation and treatment  Plan: PHYSICAL THERAPY REFERRAL        Keep hydrated, reviewed medication use and potential triggers.          Regular exercise    No follow-ups on file.    Lilian Santiago MD  Internal Medicine   [facility]

## 2020-07-03 ENCOUNTER — HOSPITAL ENCOUNTER (OUTPATIENT)
Dept: PHYSICAL THERAPY | Facility: CLINIC | Age: 35
End: 2020-07-03
Payer: COMMERCIAL

## 2020-07-03 DIAGNOSIS — R42 DIZZINESS: Primary | ICD-10-CM

## 2020-07-03 PROCEDURE — 97161 PT EVAL LOW COMPLEX 20 MIN: CPT | Mod: GP | Performed by: PHYSICAL THERAPIST

## 2020-07-03 PROCEDURE — 97112 NEUROMUSCULAR REEDUCATION: CPT | Mod: GP | Performed by: PHYSICAL THERAPIST

## 2020-07-03 NOTE — PROGRESS NOTES
07/03/20 1200   Signing Clinician's Name / Credentials   Signing clinician's name / credentials Rosita Reardon DPT   Dynamic Gait Index (Lefty and [place], 1995)   Gait Level Surface 3   Change in Gait Speed 3   Gait and Horizontal Head Turns 2   Gait with Vertical Head Turns 3     4-Item Dynamic Gait Index: Is a measure of gait responses to changing task demands in people with balance and vestibular disorders. (gait on level, w/ lateral & vertical head turns, and w/ change of speed)    Gait assistive device used: None     Patient score: 11/12  Scores <9/12 are correlated with increased risk of falling according to Maritza & Carine 2006.     Assessment (rationale for performing, application to patient s function & care plan): Assess risk for falls  Minutes billed as physical performance test: 0

## 2020-07-03 NOTE — PROGRESS NOTES
07/03/20 1100   Quick Adds   Quick Adds Vestibular Eval   Type of Visit Initial OP PT Evaluation   General Information   Start of Care Date 07/03/20   Referring Physician Lilian Santiago MD   Orders Evaluate and Treat as Indicated   Order Date 06/18/20   Medical Diagnosis Vertigo R42   Onset of illness/injury or Date of Surgery 06/18/20  (Referral date used, 8 weeks ago)   Precautions/Limitations no known precautions/limitations   Surgical/Medical history reviewed Yes   Pertinent history of current vestibular problem (include personal factors and/or comorbidities that impact the POC)  Motion sickness   Pertinent history of current problem (include personal factors and/or comorbidities that impact the POC) Patient has a PMH of tobacco use, not current. Patient reports dizziness that started about 8 weeks ago, worse with head movement and position changes. Patient reports her dizziness is intermittent and seems to get better and worse, not so bad right now. Patient reports the room is spinning when she gets dizzy, lasts about 5 sec. Patient reports that she has not been avoiding any movements but it is a nuisance. Patient reports balance is ok and she does not feel unsafe. Patient denies hearing loss, and history of neck injury, notes intermittent tinnitus.   Pertinent Visual History  Glasses for the computer, no double vision or blurry vision   Prior level of function comment IND with all functional mobility and ADLs   Current Community Support Family/friend caregiver   Patient role/Employment history Employed  (, [employer])   Living environment [facility]   Home/Community Accessibility Comments Lives with her spouse, no access issues   Assistive Devices Comments None   Patient/Family Goals Statement Improve dizziness   Fall Risk Screen   Fall screen completed by PT   Have you fallen 2 or more times in the past year? No   Have you fallen and had an injury in the past year? No   Timed Up and Go  score (seconds) NT, see 4-Item DGI   Is patient a fall risk? No   Pain   Patient currently in pain No   Vitals Signs   Heart Rate 63   SpO2 98   Blood Pressure 122/81   Vital Signs Comments Seated, resting, left arm   Cognitive Status Examination   Orientation orientation to person, place and time   Level of Consciousness alert   Follows Commands and Answers Questions 100% of the time;able to follow multistep instructions   Personal Safety and Judgment intact   Memory intact   Integumentary   Integumentary No deficits were identified   Posture   Posture Forward head position   Bed Mobility   Bed Mobility Comments Frequent onset of symptoms   Transfer Skills   Transfer Comments IND   Gait   Gait Comments Patient ambulates with good gait speed and heel strike bilaterally, no evidence of instability   Gait Special Tests   Gait Special Tests DYNAMIC GAIT INDEX   Gait Special Tests Dynamic Gait Index   Score out of 24 11/12   Comments 4-Item DGI   Sensory Examination   Sensory Perception no deficits were identified   Coordination   Coordination no deficits were identified   Muscle Tone   Muscle Tone no deficits were identified   Cervicogenic Screen   Neck ROM WNL for positional testing   Oculomotor Exam   Smooth Pursuit Normal   Saccades Normal   VOR Normal   Rapid Head Thrust Normal   Convergence Testing Normal   Infrared Goggle Exam or Frenzel Lense Exam   Vestibular Suppressant in Last 24 Hours? No   Exam completed with Infrared Goggles   Spontaneous Nystagmus Negative   Gaze Evoked Nystagmus Negative   Head Shake Horizontal Nystagmus Negative   Positional testing Negative   Positional Testing comments Repeated testing due to suspected BPPV, no nystagmus or symptoms this visit   Dynamic Visual Acuity (DVA)   Static Acuity (LogMar) 11   Horizontal Head Movement at 1 Hz (LogMar) 11   Horizontal Head Movement at 2 Hz (LogMar) 8   DVA Comments Abnormal loss of 3 lines with mild exacerbation of dizziness   Modality  Interventions   Planned Modality Interventions Comments Per therapist discretion   Planned Therapy Interventions   Planned Therapy Interventions balance training;neuromuscular re-education  (Canalith repositioning maneuvers, vestibular therapy)   Clinical Impression   Criteria for Skilled Therapeutic Interventions Met yes, treatment indicated   PT Diagnosis Suspected BPPV with impaired gaze stabilization   Influenced by the following impairments Onset of symptoms with bed mobility, episodic dizziness - currently in season with limited symptoms, impaired gait stabilization   Functional limitations due to impairments Impaired safety with onset of symptoms, impaired DVA   Clinical Presentation Stable/Uncomplicated   Clinical Presentation Rationale Medically stable   Clinical Decision Making (Complexity) Low complexity   Therapy Frequency   (4 visits as needed)   Predicted Duration of Therapy Intervention (days/wks) 90 days  (Remain open for visits as needed)   Risk & Benefits of therapy have been explained Yes   Patient, Family & other staff in agreement with plan of care Yes   Clinical Impression Comments Patient is a 34 year old female presenting to physical therapy with suspected BPPV and impaired gaze stabilization. Patient presents with negative positional testing this visit, but this therapist suspects BPPV where otoconia are lodges and causing a false negative test. Patient may benefit from physical therapy for resolution of symptoms and return to PLOF without limitation.    GOALS   PT Eval Goals 1;2;3   Goal 1   Goal Identifier HEP   Goal Description Patient will demonstrate understanding and compliance to her HEP for continued wellbeing upon discharge from skilled physical therapy.   Target Date 09/30/20   Goal 2   Goal Identifier DHI   Goal Description Patient will complete the DHI with a score of <4/100 to demonstrate decreased perception of handicap and improved quality of life.    Target Date 09/30/20    Goal 3   Goal Identifier Positional testing   Goal Description Patient will deny dizziness with change of body position for independent bed mobility and transfers for return to daily activities without limitation.   Target Date 09/30/20   Total Evaluation Time   PT Diana, Low Complexity Minutes ([id number]) 35

## 2020-07-06 ASSESSMENT — ENCOUNTER SYMPTOMS
SHORTNESS OF BREATH: 0
FEVER: 0
PALPITATIONS: 0
NERVOUS/ANXIOUS: 0
HEARTBURN: 0
HEMATURIA: 0
PARESTHESIAS: 0
CONSTIPATION: 0
HEMATOCHEZIA: 0
JOINT SWELLING: 0
MYALGIAS: 0
DYSURIA: 0
FREQUENCY: 0
NAUSEA: 0
ARTHRALGIAS: 0
WEAKNESS: 0
BREAST MASS: 0
COUGH: 0
ABDOMINAL PAIN: 0
DIARRHEA: 0
SORE THROAT: 0
CHILLS: 0
DIZZINESS: 1
HEADACHES: 0
EYE PAIN: 0

## 2020-07-06 NOTE — PROGRESS NOTES
Chief Complaint   Patient presents with     Physical     pt fasting         SUBJECTIVE:   CC: Nabila Gee is an 34 year old woman who presents for preventive health visit.     Healthy Habits:     Getting at least 3 servings of Calcium per day:  Yes    Bi-annual eye exam:  Yes    Dental care twice a year:  Yes    Sleep apnea or symptoms of sleep apnea:  None    Diet:  Regular (no restrictions)    Frequency of exercise:  4-5 days/week    Duration of exercise:  30-45 minutes    Taking medications regularly:  Yes    Medication side effects:  None    PHQ-2 Total Score: 0    Additional concerns today:  Yes      Today's PHQ-2 Score:   PHQ-2 ( 1999 [employer]izer) 7/6/2020   Q1: Little interest or pleasure in doing things 0   Q2: Feeling down, depressed or hopeless 0   PHQ-2 Score 0   Q1: Little interest or pleasure in doing things Not at all   Q2: Feeling down, depressed or hopeless Not at all   PHQ-2 Score 0       Abuse: Current or Past(Physical, Sexual or Emotional)- No  Do you feel safe in your environment? Yes        Social History     Tobacco Use     Smoking status: Former Smoker     Packs/day: 1.00     Years: 4.00     Pack years: 4.00     Smokeless tobacco: Never Used   Substance Use Topics     Alcohol use: Yes     Alcohol/week: 4.2 standard drinks     Types: 5 Standard drinks or equivalent per week     Comment: 1 glass of wine daily     If you drink alcohol do you typically have >3 drinks per day or >7 drinks per week? No    Alcohol Use 7/6/2020   Prescreen: >3 drinks/day or >7 drinks/week? Yes   Prescreen: >3 drinks/day or >7 drinks/week? -   AUDIT SCORE  8       Reviewed orders with patient.  Reviewed health maintenance and updated orders accordingly - Yes  Labs reviewed in EPIC  BP Readings from Last 3 Encounters:   07/07/20 104/62   06/18/20 116/64   04/08/19 102/70    Wt Readings from Last 3 Encounters:   07/07/20 56.3 kg (124 lb 1.6 oz)   06/18/20 57.3 kg (126 lb 6.4 oz)   05/14/20 58.7 kg (129 lb 8 oz)                   Patient Active Problem List   Diagnosis     CARDIOVASCULAR SCREENING; LDL GOAL LESS THAN 160     SIADH (syndrome of inappropriate ADH production) (H)     Benign neoplasm of skin     Family history of malignant melanoma of skin     Onychomycosis     History of abnormal Pap smear     Excessive or frequent menstruation     Past Surgical History:   Procedure Laterality Date     DILATION AND CURETTAGE, HYSTEROSCOPY DIAGNOSTIC, COMBINED N/A 2/12/2016    Procedure: COMBINED DILATION AND CURETTAGE, HYSTEROSCOPY DIAGNOSTIC;  Surgeon: Aj lCemons MD;  Location:  OR     SURGICAL HISTORY OF -   4/10    colposcopy; mild dysplasia; no treatment.     SURGICAL HISTORY OF -   1/11    colposcopy; recommended repeat       Social History     Tobacco Use     Smoking status: Former Smoker     Packs/day: 1.00     Years: 4.00     Pack years: 4.00     Smokeless tobacco: Never Used   Substance Use Topics     Alcohol use: Yes     Alcohol/week: 4.2 standard drinks     Types: 5 Standard drinks or equivalent per week     Comment: 1 glass of wine daily     Family History   Problem Relation Age of Onset     Cancer Mother         melanoma     Heart Disease Mother         murmur     Aneurysm Father         brain calcified aneurysm     C.A.D. Paternal Grandmother         valve replacement     Breast Cancer Paternal Grandmother      C.A.D. Paternal Grandfather         triple bypass     Prostate Cancer Paternal Grandfather      Diabetes Other         mom's side of family; not immediate         Current Outpatient Medications   Medication Sig Dispense Refill     cetirizine (ZYRTEC) 10 MG tablet Take 10 mg by mouth daily as needed for allergies       KELNOR 1/35 1-35 MG-MCG tablet TAKE ONE TABLET BY MOUTH ONCE DAILY 84 tablet 3     No Known Allergies  Recent Labs   Lab Test 05/14/20  1433 01/16/18  1621 04/01/14  0726  07/13/12  0848   LDL  --   --  59  --   --    HDL  --   --  56  --   --    TRIG  --   --  80  --   --    ALT 16  --   --    --  <6   CR 0.62  --   --   --   --    GFRESTIMATED >90  --   --   --   --    GFRESTBLACK >90  --   --   --   --    POTASSIUM 3.8  --   --   --   --    TSH 1.31 1.83  --    < >  --     < > = values in this interval not displayed.        Mammogram not appropriate for this patient based on age.    Pertinent mammograms are reviewed under the imaging tab.  History of abnormal Pap smear: NO - age 30-65 PAP every 5 years with negative HPV co-testing recommended  PAP / HPV Latest Ref Rng & Units 1/2/2017 3/27/2013 4/17/2012   PAP - NIL NIL NIL   HPV 16 DNA NEG Negative - -   HPV 18 DNA NEG Negative - -   OTHER HR HPV NEG Negative - -     Reviewed and updated as needed this visit by clinical staff  Tobacco  Allergies  Meds  Med Hx  Surg Hx  Fam Hx  Soc Hx        Reviewed and updated as needed this visit by Provider        Past Medical History:   Diagnosis Date     LSIL (low grade squamous intraepithelial lesion) on Pap smear 2009, 2010    STEPHANIE I on colp 4/2010     SIADH (syndrome of inappropriate ADH production) (H) 9/11    hospitalized. will see Endocrinologist      Past Surgical History:   Procedure Laterality Date     DILATION AND CURETTAGE, HYSTEROSCOPY DIAGNOSTIC, COMBINED N/A 2/12/2016    Procedure: COMBINED DILATION AND CURETTAGE, HYSTEROSCOPY DIAGNOSTIC;  Surgeon: Aj Clemons MD;  Location:  OR     SURGICAL HISTORY OF -   4/10    colposcopy; mild dysplasia; no treatment.     SURGICAL HISTORY OF -   1/11    colposcopy; recommended repeat       Review of Systems   Constitutional: Negative for chills and fever.   HENT: Negative for congestion, ear pain, hearing loss and sore throat.    Eyes: Negative for pain and visual disturbance.   Respiratory: Negative for cough and shortness of breath.    Cardiovascular: Negative for chest pain, palpitations and peripheral edema.   Gastrointestinal: Negative for abdominal pain, constipation, diarrhea, heartburn, hematochezia and nausea.   Breasts:  Negative for  "tenderness, breast mass and discharge.   Genitourinary: Negative for dysuria, frequency, genital sores, hematuria, pelvic pain, urgency, vaginal bleeding and vaginal discharge.   Musculoskeletal: Negative for arthralgias, joint swelling and myalgias.   Skin: Negative for rash.   Neurological: Positive for dizziness. Negative for weakness, headaches and paresthesias.   Psychiatric/Behavioral: Negative for mood changes. The patient is not nervous/anxious.      CONSTITUTIONAL: NEGATIVE for fever, chills, change in weight  INTEGUMENTARU/SKIN: NEGATIVE for worrisome rashes, moles or lesions  EYES: NEGATIVE for vision changes or irritation  ENT: NEGATIVE for ear, mouth and throat problems  RESP: NEGATIVE for significant cough or SOB  BREAST: NEGATIVE for masses, tenderness or discharge  CV: NEGATIVE for chest pain, palpitations or peripheral edema  GI: NEGATIVE for nausea, abdominal pain, heartburn, or change in bowel habits  : NEGATIVE for unusual urinary or vaginal symptoms. Periods are regular.  MUSCULOSKELETAL: NEGATIVE for significant arthralgias or myalgia  NEURO: NEGATIVE for weakness, dizziness or paresthesias  ENDOCRINE: NEGATIVE for temperature intolerance, skin/hair changes  PSYCHIATRIC: NEGATIVE for changes in mood or affect     OBJECTIVE:   /62   Pulse 64   Temp 98.8  F (37.1  C) (Oral)   Resp 14   Ht 1.74 m (5' 8.5\")   Wt 56.3 kg (124 lb 1.6 oz)   LMP 07/01/2020 (Exact Date)   SpO2 100%   BMI 18.59 kg/m    Physical Exam  GENERAL: healthy, alert and no distress  EYES: Eyes grossly normal to inspection, PERRL and conjunctivae and sclerae normal  HENT: ear canals and TM's normal, nose and mouth without ulcers or lesions  NECK: no adenopathy, no asymmetry, masses, or scars and thyroid normal to palpation  RESP: lungs clear to auscultation - no rales, rhonchi or wheezes  BREAST: normal without masses, tenderness or nipple discharge and no palpable axillary masses or adenopathy  CV: regular rate " "and rhythm, normal S1 S2, no S3 or S4, no murmur, click or rub, no peripheral edema and peripheral pulses strong  ABDOMEN: soft, nontender, no hepatosplenomegaly, no masses and bowel sounds normal  MS: no gross musculoskeletal defects noted, no edema  SKIN: no suspicious lesions or rashes  NEURO: Normal strength and tone, mentation intact and speech normal    Diagnostic Test Results:  Labs reviewed in Epic    ASSESSMENT/PLAN:   (Z00.00) Routine general medical examination at a health care facility  (primary encounter diagnosis)  Comment: HEALTH CARE MAINTENANCE reviewed;   Plan:     (Z71.84) Travel advice encounter  Comment: Hep A series reviewed; Scopolamine patch discussed; 6 d cruise; defer to Travel Clinic for other vaccinations and travel advice. [place] Cruise 3/2021  Plan: scopolamine (TRANSDERM) 1 MG/3DAYS 72 hr patch         (Z13.6) CARDIOVASCULAR SCREENING; LDL GOAL LESS THAN 160  Comment: screening labs reviewed.  Plan: Lipid panel reflex to direct LDL Fasting          (Z23) Encounter for immunization  Comment: HEP A 1st vaccine; 2nd 6-12 months later; she will make a nurse only visit,  Plan: HEPATITIS A VACCINE (ADULT)            COUNSELING:  Reviewed preventive health counseling, as reflected in patient instructions       Regular exercise       Healthy diet/nutrition    Estimated body mass index is 18.59 kg/m  as calculated from the following:    Height as of this encounter: 1.74 m (5' 8.5\").    Weight as of this encounter: 56.3 kg (124 lb 1.6 oz).         reports that she has quit smoking. She has a 4.00 pack-year smoking history. She has never used smokeless tobacco.      Counseling Resources:  ATP IV Guidelines  Pooled Cohorts Equation Calculator  Breast Cancer Risk Calculator  FRAX Risk Assessment  ICSI Preventive Guidelines  Dietary Guidelines for Americans, 2010  USDA's MyPlate  ASA Prophylaxis  Lung CA Screening    Lilian Santiago MD  [facility]  "

## 2020-07-07 ENCOUNTER — OFFICE VISIT (OUTPATIENT)
Dept: FAMILY MEDICINE | Facility: CLINIC | Age: 35
End: 2020-07-07
Payer: COMMERCIAL

## 2020-07-07 VITALS
HEIGHT: 69 IN | BODY MASS INDEX: 18.38 KG/M2 | DIASTOLIC BLOOD PRESSURE: 62 MMHG | WEIGHT: 124.1 LBS | HEART RATE: 64 BPM | SYSTOLIC BLOOD PRESSURE: 104 MMHG | RESPIRATION RATE: 14 BRPM | TEMPERATURE: 98.8 F | OXYGEN SATURATION: 100 %

## 2020-07-07 DIAGNOSIS — Z23 ENCOUNTER FOR IMMUNIZATION: ICD-10-CM

## 2020-07-07 DIAGNOSIS — Z71.84 TRAVEL ADVICE ENCOUNTER: ICD-10-CM

## 2020-07-07 DIAGNOSIS — Z00.00 ROUTINE GENERAL MEDICAL EXAMINATION AT A HEALTH CARE FACILITY: Primary | ICD-10-CM

## 2020-07-07 DIAGNOSIS — Z13.6 CARDIOVASCULAR SCREENING; LDL GOAL LESS THAN 160: ICD-10-CM

## 2020-07-07 LAB
CHOLEST SERPL-MCNC: 159 MG/DL
HDLC SERPL-MCNC: 60 MG/DL
LDLC SERPL CALC-MCNC: 84 MG/DL
NONHDLC SERPL-MCNC: 99 MG/DL
TRIGL SERPL-MCNC: 75 MG/DL

## 2020-07-07 PROCEDURE — 36415 COLL VENOUS BLD VENIPUNCTURE: CPT | Performed by: INTERNAL MEDICINE

## 2020-07-07 PROCEDURE — 80061 LIPID PANEL: CPT | Performed by: INTERNAL MEDICINE

## 2020-07-07 PROCEDURE — 99395 PREV VISIT EST AGE 18-39: CPT | Performed by: INTERNAL MEDICINE

## 2020-07-07 RX ORDER — SCOLOPAMINE TRANSDERMAL SYSTEM 1 MG/1
1 PATCH, EXTENDED RELEASE TRANSDERMAL
Qty: 4 PATCH | Refills: 0 | Status: SHIPPED | OUTPATIENT
Start: 2020-07-07 | End: 2021-10-05

## 2020-07-07 ASSESSMENT — MIFFLIN-ST. JEOR: SCORE: 1324.35

## 2020-10-05 ENCOUNTER — ALLIED HEALTH/NURSE VISIT (OUTPATIENT)
Dept: NURSING | Facility: CLINIC | Age: 35
End: 2020-10-05
Payer: COMMERCIAL

## 2020-10-05 DIAGNOSIS — Z23 ENCOUNTER FOR IMMUNIZATION: Primary | ICD-10-CM

## 2020-10-05 DIAGNOSIS — Z23 NEED FOR PROPHYLACTIC VACCINATION AND INOCULATION AGAINST INFLUENZA: ICD-10-CM

## 2020-10-05 PROCEDURE — 90686 IIV4 VACC NO PRSV 0.5 ML IM: CPT

## 2020-10-05 PROCEDURE — 90472 IMMUNIZATION ADMIN EACH ADD: CPT

## 2020-10-05 PROCEDURE — 90632 HEPA VACCINE ADULT IM: CPT

## 2020-10-05 PROCEDURE — 99207 PR NO CHARGE NURSE ONLY: CPT

## 2020-10-05 PROCEDURE — 90471 IMMUNIZATION ADMIN: CPT

## 2021-05-04 ENCOUNTER — MYC MEDICAL ADVICE (OUTPATIENT)
Dept: FAMILY MEDICINE | Facility: CLINIC | Age: 36
End: 2021-05-04

## 2021-05-04 NOTE — TELEPHONE ENCOUNTER
Per patient MyChart message, added pfizer COVID vaccination information below.   4/9/21 Pfizer Lot [id number]   4/30/21 Pfizer Lot [id number]     Allan GONZALEZ RN

## 2021-09-17 ENCOUNTER — NURSE TRIAGE (OUTPATIENT)
Dept: NURSING | Facility: CLINIC | Age: 36
End: 2021-09-17

## 2021-09-17 NOTE — TELEPHONE ENCOUNTER
Triage call:     Took an at home pregnancy test that was positive today  Period was supposed to begin either last Wednesday or last Wednesday- patient is unsure  Periods have been semi regular since off birth control  Wasn't actively trying to prevent pregnancy  Denies pain  Patient is requesting a blood test done in the office.    Advised an office visit - connected with OB triage who provided below information for patient- assisted in connecting with scheduling per OB RNs guidance. Also provided patient with phone number for the  for -[phone] under direction of OB triage nurse as well. Patient declines additional questions at this time.     Angella Valderrama RN BSN 9/17/2021 9:16 AM    COVID 19 Nurse Triage Plan/Patient Instructions    Please be aware that novel coronavirus (COVID-19) may be circulating in the community. If you develop symptoms such as fever, cough, or SOB or if you have concerns about the presence of another infection including coronavirus (COVID-19), please contact your health care provider or visit [email or web address].     Disposition/Instructions    In-Person Visit with provider recommended. Reference Visit Selection Guide.    Thank you for taking steps to prevent the spread of this virus.  o Limit your contact with others.  o Wear a simple mask to cover your cough.  o Wash your hands well and often.    Resources    [facility]: About COVID-19: [email or web address]    CDC: What to Do If You're Sick: www.cdc.gov/coronavirus/2019-ncov/about/steps-when-sick.html    CDC: Ending Home Isolation: www.cdc.gov/coronavirus/2019-ncov/hcp/disposition-in-home-patients.html     CDC: Caring for Someone: www.cdc.gov/coronavirus/2019-ncov/if-you-are-sick/care-for-someone.html     [facility]: Interim Guidance for Hospital Discharge to Home: www.health.[facility].[place]/diseases/coronavirus/hcp/hospdischarge.pdf    [facility] clinical trials (COVID-19 research studies):  clinicalaffairs.[facility]-clinical-trials     Below are the COVID-19 hotlines at the [place] Department of [facility] ([facility]). Interpreters are available.   o For health questions: Call 988-790-8762 or 1-306.467.2607 (7 a.m. to 7 p.m.)  o For questions about schools and childcare: Call 567-574-3155 or 1-549.194.2112 (7 a.m. to 7 p.m.)         Additional Information    Negative: Sounds like a life-threatening emergency to the triager    Negative: Abdominal pain is present    Negative: STD exposure and prevention, questions about    Negative: Patient sounds very sick or weak to the triager    Negative: Patient wants to be seen    Negative: Pregnant and ANY of the following: * Has an IUD * Prior history of 'ectopic pregnancy' * Previous tubal surgery (e.g., tubal ligation)* History of infertility    Wants a pregnancy test done in the office    Protocols used: MENSTRUAL PERIOD - MISSED OR LATE-A-OH

## 2021-09-26 ENCOUNTER — HEALTH MAINTENANCE LETTER (OUTPATIENT)
Age: 36
End: 2021-09-26

## 2021-10-05 ENCOUNTER — PRENATAL OFFICE VISIT (OUTPATIENT)
Dept: NURSING | Facility: CLINIC | Age: 36
End: 2021-10-05
Payer: COMMERCIAL

## 2021-10-05 DIAGNOSIS — O09.511 SUPERVISION OF HIGH RISK PRIMIGRAVIDA IN FIRST TRIMESTER IN PATIENT 35 YEARS OR OLDER AT TIME OF DELIVERY: Primary | ICD-10-CM

## 2021-10-05 PROCEDURE — 99207 PR NO CHARGE NURSE ONLY: CPT

## 2021-10-05 RX ORDER — PRENATAL VIT/IRON FUM/FOLIC AC 27MG-0.8MG
1 TABLET ORAL DAILY
Qty: 90 TABLET | Refills: 3
Start: 2021-10-05 | End: 2023-07-25

## 2021-10-05 NOTE — PROGRESS NOTES
"Chief Complaint   Patient presents with     Prenatal Care     New Prenatal Nurse Telephone Visit     7w6d  Estimated Date of Delivery: May 18, 2022      Initial LMP 2021  Estimated body mass index is 18.59 kg/m  as calculated from the following:    Height as of 20: 1.74 m (5' 8.5\").    Weight as of 20: 56.3 kg (124 lb 1.6 oz).  BP completed using cuff size: NA (Not Taken)    Questioned patient about current smoking habits.  Pt. quit smoking some time ago.    NPN nurse visit done over the phone. Pt will be given NPN folder and book at her upcoming appt.   Discussed optional screening available to assess chromosomal anomalies. Questions answered. Pt advised to call the clinic if she has any questions or concerns related to her pregnancy. Prenatal labs will be obtained at her upcoming appt. New prenatal visit scheduled on 10/21/21 with Dr Byers.        Lab Results   Component Value Date    PAP NIL 2017           Patient supplied answers from flow sheet for:  Prenatal OB Questionnaire.  Past Medical History  Have you ever recieved care for your mental health? : (!) Yes  Have you ever been in a major accident or suffered serious trauma?: No  Within the last year, has anyone hit, slapped, kicked or otherwise hurt you?: No  In the last year, has anyone forced you to have sex when you didn't want to?: No    Past Medical History 2   Have you ever received a blood transfusion?: No  Would you accept a blood transfusion if was medically recommended?: Yes  Does anyone in your home smoke?: No   Is your blood type Rh negative?: Unknown  Have you ever ?: No  Have you been hospitalized for a nonsurgical reason excluding normal delivery?: (!) Yes  Have you ever had an abnormal pap smear?: (!) Yes    Past Medical History (Continued)  Do you have a history of abnormalities of the uterus?: (!) Yes  Did your mother take STEPH or any other hormones when she was pregnant with you?: Unknown  Do you have " any other problems we have not asked about which you feel may be important to this pregnancy?: No    Nathalia Ovalles RN

## 2021-10-06 ENCOUNTER — LAB (OUTPATIENT)
Dept: LAB | Facility: CLINIC | Age: 36
End: 2021-10-06
Payer: COMMERCIAL

## 2021-10-06 ENCOUNTER — ANCILLARY PROCEDURE (OUTPATIENT)
Dept: ULTRASOUND IMAGING | Facility: CLINIC | Age: 36
End: 2021-10-06
Payer: COMMERCIAL

## 2021-10-06 DIAGNOSIS — O09.511 SUPERVISION OF HIGH RISK PRIMIGRAVIDA IN FIRST TRIMESTER IN PATIENT 35 YEARS OR OLDER AT TIME OF DELIVERY: ICD-10-CM

## 2021-10-06 LAB
ABO/RH(D): NORMAL
ANTIBODY SCREEN: NEGATIVE
ERYTHROCYTE [DISTWIDTH] IN BLOOD BY AUTOMATED COUNT: 11.5 % (ref 10–15)
HCT VFR BLD AUTO: 39 % (ref 35–47)
HGB BLD-MCNC: 13.2 G/DL (ref 11.7–15.7)
MCH RBC QN AUTO: 31.5 PG (ref 26.5–33)
MCHC RBC AUTO-ENTMCNC: 33.8 G/DL (ref 31.5–36.5)
MCV RBC AUTO: 93 FL (ref 78–100)
PLATELET # BLD AUTO: 198 10E3/UL (ref 150–450)
RBC # BLD AUTO: 4.19 10E6/UL (ref 3.8–5.2)
SPECIMEN EXPIRATION DATE: NORMAL
WBC # BLD AUTO: 7 10E3/UL (ref 4–11)

## 2021-10-06 PROCEDURE — 86762 RUBELLA ANTIBODY: CPT

## 2021-10-06 PROCEDURE — 86900 BLOOD TYPING SEROLOGIC ABO: CPT

## 2021-10-06 PROCEDURE — 85027 COMPLETE CBC AUTOMATED: CPT

## 2021-10-06 PROCEDURE — 76801 OB US < 14 WKS SINGLE FETUS: CPT | Performed by: OBSTETRICS & GYNECOLOGY

## 2021-10-06 PROCEDURE — 87340 HEPATITIS B SURFACE AG IA: CPT

## 2021-10-06 PROCEDURE — 86850 RBC ANTIBODY SCREEN: CPT

## 2021-10-06 PROCEDURE — 87389 HIV-1 AG W/HIV-1&-2 AB AG IA: CPT

## 2021-10-06 PROCEDURE — 36415 COLL VENOUS BLD VENIPUNCTURE: CPT

## 2021-10-06 PROCEDURE — 86780 TREPONEMA PALLIDUM: CPT

## 2021-10-06 PROCEDURE — 87086 URINE CULTURE/COLONY COUNT: CPT

## 2021-10-06 PROCEDURE — 86901 BLOOD TYPING SEROLOGIC RH(D): CPT

## 2021-10-06 PROCEDURE — 86803 HEPATITIS C AB TEST: CPT

## 2021-10-07 LAB
BACTERIA UR CULT: NO GROWTH
HBV SURFACE AG SERPL QL IA: NONREACTIVE
HCV AB SERPL QL IA: NONREACTIVE
HIV 1+2 AB+HIV1 P24 AG SERPL QL IA: NONREACTIVE
RUBV IGG SERPL QL IA: 10.8 INDEX
RUBV IGG SERPL QL IA: POSITIVE
T PALLIDUM AB SER QL: NONREACTIVE

## 2021-10-21 ENCOUNTER — PRENATAL OFFICE VISIT (OUTPATIENT)
Dept: OBGYN | Facility: CLINIC | Age: 36
End: 2021-10-21
Payer: COMMERCIAL

## 2021-10-21 VITALS
HEART RATE: 82 BPM | DIASTOLIC BLOOD PRESSURE: 64 MMHG | WEIGHT: 126.6 LBS | BODY MASS INDEX: 18.97 KG/M2 | SYSTOLIC BLOOD PRESSURE: 126 MMHG

## 2021-10-21 DIAGNOSIS — Z11.3 SCREEN FOR STD (SEXUALLY TRANSMITTED DISEASE): ICD-10-CM

## 2021-10-21 DIAGNOSIS — Z23 NEED FOR PROPHYLACTIC VACCINATION AND INOCULATION AGAINST INFLUENZA: ICD-10-CM

## 2021-10-21 DIAGNOSIS — Z12.4 SCREENING FOR CERVICAL CANCER: ICD-10-CM

## 2021-10-21 DIAGNOSIS — Z34.01 ENCOUNTER FOR PRENATAL CARE IN FIRST TRIMESTER OF FIRST PREGNANCY: Primary | ICD-10-CM

## 2021-10-21 PROCEDURE — 87624 HPV HI-RISK TYP POOLED RSLT: CPT | Performed by: OBSTETRICS & GYNECOLOGY

## 2021-10-21 PROCEDURE — G0145 SCR C/V CYTO,THINLAYER,RESCR: HCPCS | Performed by: OBSTETRICS & GYNECOLOGY

## 2021-10-21 PROCEDURE — 90471 IMMUNIZATION ADMIN: CPT | Performed by: OBSTETRICS & GYNECOLOGY

## 2021-10-21 PROCEDURE — 87591 N.GONORRHOEAE DNA AMP PROB: CPT | Performed by: OBSTETRICS & GYNECOLOGY

## 2021-10-21 PROCEDURE — 87491 CHLMYD TRACH DNA AMP PROBE: CPT | Performed by: OBSTETRICS & GYNECOLOGY

## 2021-10-21 PROCEDURE — 90686 IIV4 VACC NO PRSV 0.5 ML IM: CPT | Performed by: OBSTETRICS & GYNECOLOGY

## 2021-10-21 PROCEDURE — 99207 PR FIRST OB VISIT: CPT | Performed by: OBSTETRICS & GYNECOLOGY

## 2021-10-21 NOTE — NURSING NOTE
"Chief Complaint   Patient presents with     Prenatal Care     New Prenatal    10 weeks 1 days   Pap and G/C due   Labs done        Initial /64 (BP Location: Right arm, Cuff Size: Adult Regular)   Pulse 82   Wt 57.4 kg (126 lb 9.6 oz)   LMP 2021   Breastfeeding No   BMI 18.97 kg/m   Estimated body mass index is 18.97 kg/m  as calculated from the following:    Height as of 20: 1.74 m (5' 8.5\").    Weight as of this encounter: 57.4 kg (126 lb 9.6 oz).  BP completed using cuff size: regular    Questioned patient about current smoking habits.  Pt. has never smoked.    10w1d      The following HM Due: Pap and G/C       No symptoms       Jill Cortes, [place] on 10/21/2021 at 9:01 AM           "

## 2021-10-21 NOTE — PROGRESS NOTES
Nabila is a 35 year old  @ 10w1d by LMP c/w 7w2d ultrasound, here for new ob visit.      Denies vaginal bleeding. Reports mild cramping.   Reports normal bladder and bowel functions.   Tolerating oral fluids well.     Has inquiries regarding wanting to go on a honeymoon trip to the [place]. She also c/o of lateral right breast pain that started a couple of days ago. No masses. Sporadic sharp pains. Has gotten better recently.     ROS: Ten point review of systems was reviewed and negative except the above.    OBhx: never pregnant   Gyne: History of abnormal Pap smear (hx of STEPHANIE 1), due for a Pap smear on 2022. Denies hx of STIs     Past Medical History:   Diagnosis Date     Abnormal Pap smear of cervix      Endocrine disease      Fibroid      LSIL (low grade squamous intraepithelial lesion) on Pap smear ,     STEPHANIE I on colp 2010     SIADH (syndrome of inappropriate ADH production) (H)     hospitalized. will see Endocrinologist     Varicella      Past Surgical History:   Procedure Laterality Date     DILATION AND CURETTAGE, HYSTEROSCOPY DIAGNOSTIC, COMBINED N/A 2016    Procedure: COMBINED DILATION AND CURETTAGE, HYSTEROSCOPY DIAGNOSTIC;  Surgeon: Aj Clemons MD;  Location: [facility] OR     SURGICAL HISTORY OF -   4/10    colposcopy; mild dysplasia; no treatment.     SURGICAL HISTORY OF -       colposcopy; recommended repeat     Patient Active Problem List    Diagnosis Date Noted     Excessive or frequent menstruation 2016     Priority: Medium     History of abnormal Pap smear 2014     Priority: Medium     LSIL with STEPHANIE 1 . Has had a couple colposcopies; no treatment.  Normal pap , ,        Onychomycosis 2012     Priority: Medium     Benign neoplasm of skin 2011     Priority: Medium     Problem list name updated by automated process. Provider to review       Family history of malignant melanoma of skin 2011     Priority: Medium      SIADH (syndrome of inappropriate ADH production) (H) 09/15/2011     Priority: Medium     CARDIOVASCULAR SCREENING; LDL GOAL LESS THAN 160 02/10/2010     Priority: Medium      No Known Allergies  cetirizine (ZYRTEC) 10 MG tablet, Take 10 mg by mouth daily as needed for allergies  Prenatal Vit-Fe Fumarate-FA (PRENATAL MULTIVITAMIN W/IRON) 27-0.8 MG tablet, Take 1 tablet by mouth daily    No current facility-administered medications on file prior to visit.      Past Medical History of Father of Baby:   No significant medical history    Physical Exam: LMP 2021   General: Well developed, well nourished female  Skin: No lesions, Warm, moist and No cyanosis or pallor  HEENT: Atraumatic, normocephalic  Neck: Supple,no adenopathy,thyroid normal  Chest: Clear to auscultation  Heart: Regular rate, rhythm  Breasts: normal without suspicious masses, skin changes or axillary nodes, symmetric fibrous changes in both upper outer quadrants.    Abdomen: Soft, flat, non-tender   Extremities: No cyanosis, clubbing, warm and well perfused and No edema  Neurological: Mental Status Normal and Station and Gait Normal   Perineum: Normal   Vulva: Normal genitalia and Bartholin's, Urethra, [address]'s normal  Vagina: Normal mucosa, no discharge  Cervix: Nulliparous, closed, mobile,  no discharge  Uterus: 10 weeks, Normal shape, position and consistency   Adnexa: Not palpable  Rectum: deferred.     A/P 35 year old  at  10w1d    1. Discussed physician coverage, tertiary support, diet, exercise, weight gain, schedule of visits, routine and indicated ultrasounds, and childbirth education.    2. Options for  testing for chromosomal and birth defects were discussed with the patient including nuchal lucency/blood marker testing in the first trimester and quad screening and/or Level 2 ultrasound in the second trimester.  We discussed that these are screening tests and not diagnostic tests and that false positives and negatives are a  distinct possibility.  We discussed that follow up diagnostic testing would include chorionic villus sampling or amniocentesis depending on gestational age.Also, discussed NIPT testing. Patient elects to proceed with NIPT testing. Will return in 1 week for RN visit for this.    3. Inquiries above addressed.     4. Reviewed normal prenatal labs. GC/CT cultures collected and Pap smear today.     5. Prenatal Vitamins encouraged.     6. RTC in 4 weeks. SAB precautions reviewed.     Modesta Vail MD  [facility]

## 2021-10-22 LAB
C TRACH DNA SPEC QL NAA+PROBE: NEGATIVE
N GONORRHOEA DNA SPEC QL NAA+PROBE: NEGATIVE

## 2021-10-25 LAB
BKR LAB AP GYN ADEQUACY: NORMAL
BKR LAB AP GYN INTERPRETATION: NORMAL
BKR LAB AP HPV REFLEX: NORMAL
BKR LAB AP LMP: NORMAL
BKR LAB AP PREVIOUS ABNORMAL: NORMAL
PATH REPORT.COMMENTS IMP SPEC: NORMAL
PATH REPORT.RELEVANT HX SPEC: NORMAL

## 2021-10-26 LAB
HUMAN PAPILLOMA VIRUS 16 DNA: NEGATIVE
HUMAN PAPILLOMA VIRUS 18 DNA: NEGATIVE
HUMAN PAPILLOMA VIRUS FINAL DIAGNOSIS: NORMAL
HUMAN PAPILLOMA VIRUS OTHER HR: NEGATIVE

## 2021-10-29 ENCOUNTER — ALLIED HEALTH/NURSE VISIT (OUTPATIENT)
Dept: NURSING | Facility: CLINIC | Age: 36
End: 2021-10-29
Payer: COMMERCIAL

## 2021-10-29 DIAGNOSIS — O09.511 SUPERVISION OF HIGH RISK PRIMIGRAVIDA IN FIRST TRIMESTER IN PATIENT 35 YEARS OR OLDER AT TIME OF DELIVERY: Primary | ICD-10-CM

## 2021-10-29 PROCEDURE — 99207 PR NO CHARGE NURSE ONLY: CPT

## 2021-10-29 PROCEDURE — 36415 COLL VENOUS BLD VENIPUNCTURE: CPT

## 2021-11-03 LAB — SCANNED LAB RESULT: NORMAL

## 2021-11-11 ENCOUNTER — MYC MEDICAL ADVICE (OUTPATIENT)
Dept: OBGYN | Facility: CLINIC | Age: 36
End: 2021-11-11
Payer: COMMERCIAL

## 2021-11-15 ENCOUNTER — PRENATAL OFFICE VISIT (OUTPATIENT)
Dept: OBGYN | Facility: CLINIC | Age: 36
End: 2021-11-15
Payer: COMMERCIAL

## 2021-11-15 VITALS
HEART RATE: 70 BPM | DIASTOLIC BLOOD PRESSURE: 64 MMHG | SYSTOLIC BLOOD PRESSURE: 104 MMHG | BODY MASS INDEX: 19.46 KG/M2 | WEIGHT: 129.9 LBS

## 2021-11-15 DIAGNOSIS — O21.9 NAUSEA AND VOMITING IN PREGNANCY: ICD-10-CM

## 2021-11-15 DIAGNOSIS — Z34.01 ENCOUNTER FOR PRENATAL CARE IN FIRST TRIMESTER OF FIRST PREGNANCY: Primary | ICD-10-CM

## 2021-11-15 PROCEDURE — 99207 PR PRENATAL VISIT: CPT | Performed by: OBSTETRICS & GYNECOLOGY

## 2021-11-15 RX ORDER — ONDANSETRON 4 MG/1
4-8 TABLET, FILM COATED ORAL EVERY 8 HOURS PRN
Qty: 30 TABLET | Refills: 1 | Status: SHIPPED | OUTPATIENT
Start: 2021-11-15 | End: 2022-01-06

## 2021-11-15 NOTE — PROGRESS NOTES
Doing well.   Will be going on her trip to the [place]. Inquires about scopolamine patch use in pregnancy.   Otherwise, denies VB, cramping.   /64 (BP Location: Left arm, Cuff Size: Adult Regular)   Pulse 70   Wt 58.9 kg (129 lb 14.4 oz)   LMP 2021   Breastfeeding No   BMI 19.46 kg/m    General Appearance: NAD  Abdomen: Gravid, NT  Refer to flow sheet above.   A transabdominal ultrasound was performed today. A single intrauterine pregnancy was seen with fetal cardiac activity measuring at 149 bpm.       A/P: 35 year old  at 13w5d  -- reviewed scopolamine patch use in pregnancy; reviewed literature and recommended alternative medication ie zofran for nausea prevention; therefore, Zofran PO prescribed   -- SAB precautions reviewed  RTC in 3 weeks prior to her going on her trip     Modesta Vail MD  [facility]

## 2021-12-06 ENCOUNTER — PRENATAL OFFICE VISIT (OUTPATIENT)
Dept: OBGYN | Facility: CLINIC | Age: 36
End: 2021-12-06
Payer: COMMERCIAL

## 2021-12-06 VITALS
HEART RATE: 81 BPM | DIASTOLIC BLOOD PRESSURE: 72 MMHG | SYSTOLIC BLOOD PRESSURE: 112 MMHG | WEIGHT: 133.6 LBS | BODY MASS INDEX: 20.02 KG/M2

## 2021-12-06 DIAGNOSIS — Z34.92 PRENATAL CARE IN SECOND TRIMESTER: Primary | ICD-10-CM

## 2021-12-06 PROCEDURE — 99207 PR PRENATAL VISIT: CPT | Performed by: OBSTETRICS & GYNECOLOGY

## 2021-12-06 PROCEDURE — 36415 COLL VENOUS BLD VENIPUNCTURE: CPT | Performed by: OBSTETRICS & GYNECOLOGY

## 2021-12-06 PROCEDURE — 82105 ALPHA-FETOPROTEIN SERUM: CPT | Mod: 90 | Performed by: OBSTETRICS & GYNECOLOGY

## 2021-12-06 PROCEDURE — 99000 SPECIMEN HANDLING OFFICE-LAB: CPT | Performed by: OBSTETRICS & GYNECOLOGY

## 2021-12-06 NOTE — NURSING NOTE
"Chief Complaint   Patient presents with     Prenatal Care     16 weeks 5 days U/S 2021       Initial /72 (BP Location: Right arm, Cuff Size: Adult Regular)   Pulse 81   Wt 60.6 kg (133 lb 9.6 oz)   LMP 2021   Breastfeeding No   BMI 20.02 kg/m   Estimated body mass index is 20.02 kg/m  as calculated from the following:    Height as of 20: 1.74 m (5' 8.5\").    Weight as of this encounter: 60.6 kg (133 lb 9.6 oz).  BP completed using cuff size: regular    Questioned patient about current smoking habits.  Pt. has never smoked.    16w5d      The following HM Due: NONE      +Feeling well         Jill Cortes, [place] on 2021 at 9:04 AM           "

## 2021-12-06 NOTE — PROGRESS NOTES
Doing well.  Will be going to the their trip to the [place] tomorrow.   She notices a bulge in her substernal area and wonders if it is rectus muscle diathesis.    Denies VB, ctx, LOF. +FM  /72 (BP Location: Right arm, Cuff Size: Adult Regular)   Pulse 81   Wt 60.6 kg (133 lb 9.6 oz)   LMP 2021   Breastfeeding No   BMI 20.02 kg/m    General Appearance: NAD  Abdomen: Gravid, NT  Refer to flow sheet above.   A/P: 36 year old  at 16w5d  -- above concerns addressed  -- offered MS-AFP and patient agrees to getting this; lab ordered  -- SAB precautions reviewed  RTC in 4 weeks    Modesta Vail MD  [facility] [address] [facility]

## 2021-12-07 LAB
# FETUSES US: NORMAL
AFP MOM SERPL: 1.1
AFP SERPL-MCNC: 45 NG/ML
AGE - REPORTED: 35.5 YR
CURRENT SMOKER: NO
FAMILY MEMBER DISEASES HX: NO
GA METHOD: NORMAL
GA: NORMAL WK
IDDM PATIENT QL: NO
INTEGRATED SCN PATIENT-IMP: NORMAL
SPECIMEN DRAWN SERPL: NORMAL

## 2021-12-28 ENCOUNTER — ANCILLARY PROCEDURE (OUTPATIENT)
Dept: ULTRASOUND IMAGING | Facility: CLINIC | Age: 36
End: 2021-12-28
Attending: OBSTETRICS & GYNECOLOGY
Payer: COMMERCIAL

## 2021-12-28 ENCOUNTER — TRANSCRIBE ORDERS (OUTPATIENT)
Dept: MATERNAL FETAL MEDICINE | Facility: CLINIC | Age: 36
End: 2021-12-28

## 2021-12-28 ENCOUNTER — TELEPHONE (OUTPATIENT)
Dept: OBGYN | Facility: CLINIC | Age: 36
End: 2021-12-28

## 2021-12-28 DIAGNOSIS — O26.90 PREGNANCY RELATED CONDITION, ANTEPARTUM: Primary | ICD-10-CM

## 2021-12-28 DIAGNOSIS — Z34.01 ENCOUNTER FOR PRENATAL CARE IN FIRST TRIMESTER OF FIRST PREGNANCY: ICD-10-CM

## 2021-12-28 DIAGNOSIS — O28.3 ECHOGENIC INTRACARDIAC FOCUS OF FETUS ON PRENATAL ULTRASOUND: ICD-10-CM

## 2021-12-28 DIAGNOSIS — O44.02 PLACENTA PREVIA IN SECOND TRIMESTER: Primary | ICD-10-CM

## 2021-12-28 PROCEDURE — 76805 OB US >/= 14 WKS SNGL FETUS: CPT | Performed by: FAMILY MEDICINE

## 2021-12-28 NOTE — TELEPHONE ENCOUNTER
Estimated Date of Delivery: May 18, 2022   Placenta previa and EIF diagnosed, referral made to [facility]   Discussed recommendations      Dr. Seema Ornelas,     Obstetrics and Gynecology  [facility] [place] [facility] [place]

## 2021-12-30 ENCOUNTER — PRE VISIT (OUTPATIENT)
Dept: MATERNAL FETAL MEDICINE | Facility: CLINIC | Age: 36
End: 2021-12-30
Payer: COMMERCIAL

## 2022-01-04 ENCOUNTER — OFFICE VISIT (OUTPATIENT)
Dept: MATERNAL FETAL MEDICINE | Facility: CLINIC | Age: 37
End: 2022-01-04
Attending: FAMILY MEDICINE
Payer: COMMERCIAL

## 2022-01-04 ENCOUNTER — HOSPITAL ENCOUNTER (OUTPATIENT)
Dept: ULTRASOUND IMAGING | Facility: CLINIC | Age: 37
End: 2022-01-04
Attending: FAMILY MEDICINE
Payer: COMMERCIAL

## 2022-01-04 DIAGNOSIS — O35.BXX0 ECHOGENIC FOCUS OF HEART OF FETUS AFFECTING ANTEPARTUM CARE OF MOTHER, SINGLE OR UNSPECIFIED FETUS: ICD-10-CM

## 2022-01-04 DIAGNOSIS — O44.02 PLACENTA PREVIA ANTEPARTUM IN SECOND TRIMESTER: ICD-10-CM

## 2022-01-04 DIAGNOSIS — O09.512 AMA (ADVANCED MATERNAL AGE) PRIMIGRAVIDA 35+, SECOND TRIMESTER: Primary | ICD-10-CM

## 2022-01-04 DIAGNOSIS — O26.90 PREGNANCY RELATED CONDITION, ANTEPARTUM: ICD-10-CM

## 2022-01-04 PROCEDURE — 76811 OB US DETAILED SNGL FETUS: CPT | Mod: 26 | Performed by: OBSTETRICS & GYNECOLOGY

## 2022-01-04 PROCEDURE — 76811 OB US DETAILED SNGL FETUS: CPT

## 2022-01-04 NOTE — PROGRESS NOTES
"Please see \"Imaging\" tab under \"Chart Review\" for details of today's visit.    Torres Peraza    "

## 2022-01-06 ENCOUNTER — PRENATAL OFFICE VISIT (OUTPATIENT)
Dept: OBGYN | Facility: CLINIC | Age: 37
End: 2022-01-06
Attending: OBSTETRICS & GYNECOLOGY
Payer: COMMERCIAL

## 2022-01-06 VITALS
WEIGHT: 140 LBS | DIASTOLIC BLOOD PRESSURE: 64 MMHG | BODY MASS INDEX: 20.73 KG/M2 | HEIGHT: 69 IN | SYSTOLIC BLOOD PRESSURE: 102 MMHG

## 2022-01-06 DIAGNOSIS — O44.02 PLACENTA PREVIA IN SECOND TRIMESTER: Primary | ICD-10-CM

## 2022-01-06 DIAGNOSIS — O28.3 ECHOGENIC INTRACARDIAC FOCUS OF FETUS ON PRENATAL ULTRASOUND: ICD-10-CM

## 2022-01-06 PROCEDURE — 99207 PR PRENATAL VISIT: CPT | Performed by: FAMILY MEDICINE

## 2022-01-06 ASSESSMENT — MIFFLIN-ST. JEOR: SCORE: 1386.48

## 2022-01-06 NOTE — PATIENT INSTRUCTIONS
"Return 4 weeks   Return to clinic:  every 4 weeks till 28 weeks, then every 2 weeks till 36 weeks, then weekly till delivery      Phone numbers [place]:  Day/ night [phone] ask for ob triage  Emergency:  Call labor and delivery:  [phone]    What should I call about??    Contraction every 5 minutes for 1 hour 1 minute long (511), bleeding, loss of fluid, headache that doesn't resolve with tylenol, and decreased fetal movement     Start kick counts @ 26-28 weeks   There is an augusto for this!  It is called \"count the kicks\"  Keep track of movement and discover your normal baby movement pattern   guideline is listed below  Please call if you do not feel the baby move!  We will have you come in for fetal heart rate monitoring:   Perception of at least 10 FMs during 12 hours of normal maternal activity   Perception of least 10 FMs over two hours when the mother is at rest and focused on [facility] Address   [address], [place], [place] [address] [phone]    Dr. Seema Ornelas, DO    OB/GYN   [facility]                                                      "

## 2022-01-06 NOTE — PROGRESS NOTES
"CC: Here for routine prenatal visit   36 year old y/o  @ 21w1d with Estimated Date of Delivery: May 18, 2022     /64 (BP Location: Right arm, Patient Position: Chair, Cuff Size: Adult Regular)   Ht 1.74 m (5' 8.5\")   Wt 63.5 kg (140 lb)   LMP 2021   Breastfeeding No   BMI 20.98 kg/m       See OB flowsheet  + fetal movement, no contractions, no bleeding, no loss of fluid   Discussed monitoring fetal movement     1) concerns: discussed previa  Covid-19 concerns: covid infection and vaccination recommendations discussed.   2) Routine: Blood type A+ RI tdap [ ] flu [x] GS [nl] NIPT [nl] AFP [nl]   Covid v [vac and booster] gtt [ ]  3) Risk factors:   A: Previa:  MFM following   B: EIF: level II verifying this. Normal NIPT    4) Return: 4 weeks     [place]     "

## 2022-01-06 NOTE — NURSING NOTE
"Chief Complaint   Patient presents with     Prenatal Care     21 1/7 weeks       Initial /64 (BP Location: Right arm, Patient Position: Chair, Cuff Size: Adult Regular)   Ht 1.74 m (5' 8.5\")   Wt 63.5 kg (140 lb)   LMP 2021   Breastfeeding No   BMI 20.98 kg/m   Estimated body mass index is 20.98 kg/m  as calculated from the following:    Height as of this encounter: 1.74 m (5' 8.5\").    Weight as of this encounter: 63.5 kg (140 lb).  BP completed using cuff size: regular    Questioned patient about current smoking habits.  Pt. has never smoked.          The following HM Due: NONE    +flutters    Darshana Olea CMA    "

## 2022-01-25 ENCOUNTER — HOSPITAL ENCOUNTER (OUTPATIENT)
Dept: ULTRASOUND IMAGING | Facility: CLINIC | Age: 37
End: 2022-01-25
Attending: OBSTETRICS & GYNECOLOGY
Payer: COMMERCIAL

## 2022-01-25 ENCOUNTER — OFFICE VISIT (OUTPATIENT)
Dept: MATERNAL FETAL MEDICINE | Facility: CLINIC | Age: 37
End: 2022-01-25
Attending: OBSTETRICS & GYNECOLOGY
Payer: COMMERCIAL

## 2022-01-25 DIAGNOSIS — O44.02 PLACENTA PREVIA ANTEPARTUM IN SECOND TRIMESTER: ICD-10-CM

## 2022-01-25 DIAGNOSIS — O09.512 AMA (ADVANCED MATERNAL AGE) PRIMIGRAVIDA 35+, SECOND TRIMESTER: ICD-10-CM

## 2022-01-25 DIAGNOSIS — O09.512 AMA (ADVANCED MATERNAL AGE) PRIMIGRAVIDA 35+, SECOND TRIMESTER: Primary | ICD-10-CM

## 2022-01-25 PROCEDURE — 76816 OB US FOLLOW-UP PER FETUS: CPT

## 2022-01-25 PROCEDURE — 76816 OB US FOLLOW-UP PER FETUS: CPT | Mod: 26 | Performed by: OBSTETRICS & GYNECOLOGY

## 2022-01-25 NOTE — PROGRESS NOTES
Please refer to ultrasound report under 'Imaging' Studies of 'Chart Review' tabs.    Ahsan Garcia M.D.

## 2022-02-10 ENCOUNTER — PRENATAL OFFICE VISIT (OUTPATIENT)
Dept: OBGYN | Facility: CLINIC | Age: 37
End: 2022-02-10
Payer: COMMERCIAL

## 2022-02-10 VITALS — WEIGHT: 149.2 LBS | DIASTOLIC BLOOD PRESSURE: 66 MMHG | BODY MASS INDEX: 22.36 KG/M2 | SYSTOLIC BLOOD PRESSURE: 122 MMHG

## 2022-02-10 DIAGNOSIS — Z34.92 PRENATAL CARE IN SECOND TRIMESTER: Primary | ICD-10-CM

## 2022-02-10 LAB
ERYTHROCYTE [DISTWIDTH] IN BLOOD BY AUTOMATED COUNT: 12.5 % (ref 10–15)
HCT VFR BLD AUTO: 34 % (ref 35–47)
HGB BLD-MCNC: 11.2 G/DL (ref 11.7–15.7)
MCH RBC QN AUTO: 32.1 PG (ref 26.5–33)
MCHC RBC AUTO-ENTMCNC: 32.9 G/DL (ref 31.5–36.5)
MCV RBC AUTO: 97 FL (ref 78–100)
PLATELET # BLD AUTO: 179 10E3/UL (ref 150–450)
RBC # BLD AUTO: 3.49 10E6/UL (ref 3.8–5.2)
WBC # BLD AUTO: 10.9 10E3/UL (ref 4–11)

## 2022-02-10 PROCEDURE — 36415 COLL VENOUS BLD VENIPUNCTURE: CPT | Performed by: OBSTETRICS & GYNECOLOGY

## 2022-02-10 PROCEDURE — 82950 GLUCOSE TEST: CPT | Performed by: OBSTETRICS & GYNECOLOGY

## 2022-02-10 PROCEDURE — 99207 PR PRENATAL VISIT: CPT | Performed by: OBSTETRICS & GYNECOLOGY

## 2022-02-10 PROCEDURE — 85027 COMPLETE CBC AUTOMATED: CPT | Performed by: OBSTETRICS & GYNECOLOGY

## 2022-02-10 PROCEDURE — 86780 TREPONEMA PALLIDUM: CPT | Performed by: OBSTETRICS & GYNECOLOGY

## 2022-02-10 NOTE — PROGRESS NOTES
Doing well.   Endorses soreness in her left knee and she suspects the elasticity of her ligaments from her pregnancy. She is concern that she could cause an injury. Soreness is for the most part tolerable and wonders what she can do to prevent potential injury to the knee. Also, concerned about her weight in the pregnancy.   She also states that her medical insurance will send in a breast pump prescription for us to sign.   Denies VB, ctx, LOF. +FM  /66 (BP Location: Right arm, Cuff Size: Adult Regular)   Wt 67.7 kg (149 lb 3.2 oz)   LMP 2021   Breastfeeding No   BMI 22.36 kg/m    General Appearance: NAD  Abdomen: Gravid, NT  LE: visually, the knees do not appear edematous or swollen  Refer to flow sheet above.   A/P: 36 year old  at 26w1d  -- hx of placenta previa; seen to be resolved at her last [facility] ultrasound on   -- above concerns addressed; recommended knee brace to help with knee stability and patient agrees   -- 1 hr GCT, CBC and RPR today  -- PTL precautions reviewed  RTC in 4 weeks    Modesta Vail MD  [facility]

## 2022-02-10 NOTE — NURSING NOTE
"Chief Complaint   Patient presents with     Prenatal Care     26 weeks 1 day  GCT RPR CBC today        Initial /66 (BP Location: Right arm, Cuff Size: Adult Regular)   Wt 67.7 kg (149 lb 3.2 oz)   LMP 2021   Breastfeeding No   BMI 22.36 kg/m   Estimated body mass index is 22.36 kg/m  as calculated from the following:    Height as of 22: 1.74 m (5' 8.5\").    Weight as of this encounter: 67.7 kg (149 lb 3.2 oz).  BP completed using cuff size: regular    Questioned patient about current smoking habits.  Pt. quit smoking some time ago.    26w1d      The following HM Due: NONE    +FM daily       Jill Cortes, CMA on 2/10/2022 at 9:14 AM             "

## 2022-02-11 LAB
GLUCOSE 1H P 50 G GLC PO SERPL-MCNC: 92 MG/DL (ref 70–129)
T PALLIDUM AB SER QL: NONREACTIVE

## 2022-03-10 ENCOUNTER — PRENATAL OFFICE VISIT (OUTPATIENT)
Dept: OBGYN | Facility: CLINIC | Age: 37
End: 2022-03-10
Payer: COMMERCIAL

## 2022-03-10 VITALS — BODY MASS INDEX: 23.08 KG/M2 | SYSTOLIC BLOOD PRESSURE: 108 MMHG | WEIGHT: 154 LBS | DIASTOLIC BLOOD PRESSURE: 64 MMHG

## 2022-03-10 DIAGNOSIS — Z34.93 PRENATAL CARE IN THIRD TRIMESTER: Primary | ICD-10-CM

## 2022-03-10 PROCEDURE — 99207 PR PRENATAL VISIT: CPT | Performed by: OBSTETRICS & GYNECOLOGY

## 2022-03-10 PROCEDURE — 90715 TDAP VACCINE 7 YRS/> IM: CPT | Performed by: OBSTETRICS & GYNECOLOGY

## 2022-03-10 PROCEDURE — 90471 IMMUNIZATION ADMIN: CPT | Performed by: OBSTETRICS & GYNECOLOGY

## 2022-03-10 NOTE — PROGRESS NOTES
Doing well.   No complaints. Her knee pain is much improved; thinks that walking helps with that.   Does report increased LE swelling.   Denies VB, ctx, LOF. +FM  /64   Wt 69.9 kg (154 lb)   LMP 2021   BMI 23.08 kg/m    General Appearance: NAD  Abdomen: Gravid, NT  Refer to flow sheet above.   A/P: 36 year old  at 30w1d  -- reviewed normal 1 hr GCT, negative RPR and mildly low Hgb; encouraged to increase iron rich foods  -- PTL precautions reviewed  RTC in 2 weeks     Modesta Vail MD  [facility]

## 2022-03-10 NOTE — NURSING NOTE
"Chief Complaint   Patient presents with     Prenatal Care       Initial /64   Wt 69.9 kg (154 lb)   LMP 2021   BMI 23.08 kg/m   Estimated body mass index is 23.08 kg/m  as calculated from the following:    Height as of 22: 1.74 m (5' 8.5\").    Weight as of this encounter: 69.9 kg (154 lb).  BP completed using cuff size: regular    Questioned patient about current smoking habits.  Pt. has never smoked.          30w1d    Jenelle Reynolds, [facility]            "

## 2022-03-28 ENCOUNTER — PRENATAL OFFICE VISIT (OUTPATIENT)
Dept: OBGYN | Facility: CLINIC | Age: 37
End: 2022-03-28
Payer: COMMERCIAL

## 2022-03-28 VITALS — BODY MASS INDEX: 23.67 KG/M2 | WEIGHT: 158 LBS | DIASTOLIC BLOOD PRESSURE: 70 MMHG | SYSTOLIC BLOOD PRESSURE: 108 MMHG

## 2022-03-28 DIAGNOSIS — Z34.00 SUPERVISION OF NORMAL FIRST PREGNANCY, ANTEPARTUM: Primary | ICD-10-CM

## 2022-03-28 PROCEDURE — 99207 PR PRENATAL VISIT: CPT | Performed by: OBSTETRICS & GYNECOLOGY

## 2022-03-28 NOTE — LETTER
[address] [address]  Tel. [phone]  Fax [phone]      To Whom it May Concern    Re: Nabila Zapata    1985    Ms Zapata is a patient of ours receiving prenatal care thru our office.  As I understand, her  is required to work in [place] at this time.  I think it would be very beneficial if he could be here in [place] particularly now that Ms Zapata is in the third and final trimester of her pregnancy.  Thank you for your help and understanding in this matter          Kj GARCIAOG

## 2022-03-28 NOTE — PROGRESS NOTES
Nabila Zapata is a 36 year old female, 32w5d, Estimated Date of Delivery: May 18, 2022 who presents for prenatal care.    Good fetal movement.  Patient is doing well without concerns.  The patient's  is working in [place].  She asked if I would write a note asking if he could please be here in [place] during her third trimester.  I did write such a letter and gave it to the patient    A/P: Intrauterine pregnancy at 32-5/7 weeks gestation doing well.  Signs and symptoms of concern discussed the patient will call

## 2022-03-28 NOTE — NURSING NOTE
"Chief Complaint   Patient presents with     Prenatal Care       Initial /70   Wt 71.7 kg (158 lb)   LMP 2021   BMI 23.67 kg/m   Estimated body mass index is 23.67 kg/m  as calculated from the following:    Height as of 22: 1.74 m (5' 8.5\").    Weight as of this encounter: 71.7 kg (158 lb).  BP completed using cuff size: regular    Questioned patient about current smoking habits.  Pt. has never smoked.          32w5d        Jenelle Reynolds [facility]            "

## 2022-03-28 NOTE — PATIENT INSTRUCTIONS
You can reach your [facility] Care Team any time of the day by calling [phone]. This number will put you in touch with the 24 hour nurse line if the clinic is closed.    To contact your OB/GYN Station Coordinator/Surgery Scheduler please call [phone]. This is a direct number for your care team between 8 a.m. and 4 p.m. Monday through Friday.    [facility] is open for your convenience:  Monday through Friday 8 a.m. to 6 p.m.  Closed weekends and all major holidays.

## 2022-04-04 ENCOUNTER — PRENATAL OFFICE VISIT (OUTPATIENT)
Dept: OBGYN | Facility: CLINIC | Age: 37
End: 2022-04-04
Payer: COMMERCIAL

## 2022-04-04 VITALS
BODY MASS INDEX: 23.31 KG/M2 | HEIGHT: 69 IN | WEIGHT: 157.4 LBS | DIASTOLIC BLOOD PRESSURE: 70 MMHG | SYSTOLIC BLOOD PRESSURE: 110 MMHG

## 2022-04-04 DIAGNOSIS — Z34.00 SUPERVISION OF NORMAL FIRST PREGNANCY, ANTEPARTUM: Primary | ICD-10-CM

## 2022-04-04 PROCEDURE — 99207 PR PRENATAL VISIT: CPT | Performed by: OBSTETRICS & GYNECOLOGY

## 2022-04-04 NOTE — NURSING NOTE
"33w5d    Chief Complaint   Patient presents with     Prenatal Care     painful to walk in pelvic area--worse when she sits all day at work--noticed hard \"spot\" in right underarm       Initial /70   Ht 1.74 m (5' 8.5\")   Wt 71.4 kg (157 lb 6.4 oz)   LMP 2021   BMI 23.58 kg/m   Estimated body mass index is 23.58 kg/m  as calculated from the following:    Height as of this encounter: 1.74 m (5' 8.5\").    Weight as of this encounter: 71.4 kg (157 lb 6.4 oz).  BP completed using cuff size: regular    Questioned patient about current smoking habits.  Pt. quit smoking some time ago.          The following HM Due: NONE           "

## 2022-04-07 NOTE — PROGRESS NOTES
"Doing well.  No complaints.   Denies VB, ctx, LOF. +FM  /70   Ht 1.74 m (5' 8.5\")   Wt 71.4 kg (157 lb 6.4 oz)   LMP 2021   BMI 23.58 kg/m    General Appearance: NAD  Abdomen: Gravid, NT  Refer to flow sheet above.   A/P: 36 year old  at 33w5d  -- PTL precautions reviewed  RTC in 2 weeks for GBS and cervix check    Modesta Vail MD  [facility]             "

## 2022-04-22 ENCOUNTER — PRENATAL OFFICE VISIT (OUTPATIENT)
Dept: OBGYN | Facility: CLINIC | Age: 37
End: 2022-04-22
Payer: COMMERCIAL

## 2022-04-22 VITALS
DIASTOLIC BLOOD PRESSURE: 76 MMHG | SYSTOLIC BLOOD PRESSURE: 120 MMHG | WEIGHT: 161.7 LBS | BODY MASS INDEX: 24.23 KG/M2 | HEART RATE: 76 BPM

## 2022-04-22 DIAGNOSIS — Z34.03 ENCOUNTER FOR SUPERVISION OF NORMAL FIRST PREGNANCY IN THIRD TRIMESTER: Primary | ICD-10-CM

## 2022-04-22 PROCEDURE — 99207 PR PRENATAL VISIT: CPT | Performed by: OBSTETRICS & GYNECOLOGY

## 2022-04-22 PROCEDURE — 87653 STREP B DNA AMP PROBE: CPT | Performed by: OBSTETRICS & GYNECOLOGY

## 2022-04-22 NOTE — PROGRESS NOTES
Doing well.   No complaints.   Denies VB, ctx, LOF. +FM  /76 (BP Location: Right arm, Cuff Size: Adult Regular)   Pulse 76   Wt 73.3 kg (161 lb 11.2 oz)   LMP 2021   Breastfeeding No   BMI 24.23 kg/m    General Appearance: NAD  Abdomen: Gravid, NT  Refer to flow sheet above.   A/P: 36 year old  at 36w2d  -- GBS collected today  -- labor precautions reviewed  RTC in 1 week     Modesta Vail MD  [facility]

## 2022-04-22 NOTE — NURSING NOTE
"Chief Complaint   Patient presents with     Prenatal Care     36 weeks 2 days   GBS=Due        Initial /76 (BP Location: Right arm, Cuff Size: Adult Regular)   Pulse 76   Wt 73.3 kg (161 lb 11.2 oz)   LMP 2021   Breastfeeding No   BMI 24.23 kg/m   Estimated body mass index is 24.23 kg/m  as calculated from the following:    Height as of 22: 1.74 m (5' 8.5\").    Weight as of this encounter: 73.3 kg (161 lb 11.2 oz).  BP completed using cuff size: regular    Questioned patient about current smoking habits.  Pt. Quit some time ago .    36w2d      The following HM Due: NONE          +FM daily   +Swelling in ankles       Jill Cortes, [facility] on 2022 at 9:41 AM           "

## 2022-04-23 LAB — GP B STREP DNA SPEC QL NAA+PROBE: NEGATIVE

## 2022-04-28 ENCOUNTER — PRENATAL OFFICE VISIT (OUTPATIENT)
Dept: OBGYN | Facility: CLINIC | Age: 37
End: 2022-04-28
Payer: COMMERCIAL

## 2022-04-28 VITALS — DIASTOLIC BLOOD PRESSURE: 72 MMHG | SYSTOLIC BLOOD PRESSURE: 124 MMHG | BODY MASS INDEX: 24.66 KG/M2 | WEIGHT: 164.6 LBS

## 2022-04-28 DIAGNOSIS — Z34.03 ENCOUNTER FOR SUPERVISION OF NORMAL FIRST PREGNANCY IN THIRD TRIMESTER: Primary | ICD-10-CM

## 2022-04-28 PROCEDURE — 99207 PR PRENATAL VISIT: CPT | Performed by: OBSTETRICS & GYNECOLOGY

## 2022-04-28 NOTE — NURSING NOTE
"Chief Complaint   Patient presents with     Prenatal Care     37w 1d       Initial /72 (BP Location: Left arm, Cuff Size: Adult Regular)   Wt 74.7 kg (164 lb 9.6 oz)   LMP 2021   Breastfeeding No   BMI 24.66 kg/m   Estimated body mass index is 24.66 kg/m  as calculated from the following:    Height as of 22: 1.74 m (5' 8.5\").    Weight as of this encounter: 74.7 kg (164 lb 9.6 oz).  BP completed using cuff size: regular    Questioned patient about current smoking habits.  Pt. has never smoked.          +FM daily  No concerns reports  Declines pelvic exam  "

## 2022-04-28 NOTE — PROGRESS NOTES
Doing well.   No complaints.   Denies VB, ctx, LOF. +FM  /72 (BP Location: Left arm, Cuff Size: Adult Regular)   Wt 74.7 kg (164 lb 9.6 oz)   LMP 2021   Breastfeeding No   BMI 24.66 kg/m    General Appearance: NAD  Abdomen: Gravid, NT  Refer to flow sheet above.   A/P: 36 year old  at 37w1d   -- labor precautions reviewed  RTC in 1 week     Mesha Minor MD   [facility]

## 2022-05-06 ENCOUNTER — PRENATAL OFFICE VISIT (OUTPATIENT)
Dept: OBGYN | Facility: CLINIC | Age: 37
End: 2022-05-06
Payer: COMMERCIAL

## 2022-05-06 VITALS
HEIGHT: 69 IN | SYSTOLIC BLOOD PRESSURE: 122 MMHG | BODY MASS INDEX: 24.73 KG/M2 | DIASTOLIC BLOOD PRESSURE: 70 MMHG | WEIGHT: 167 LBS

## 2022-05-06 DIAGNOSIS — Z34.80 SUPERVISION OF OTHER NORMAL PREGNANCY, ANTEPARTUM: Primary | ICD-10-CM

## 2022-05-06 PROCEDURE — 99207 PR PRENATAL VISIT: CPT | Performed by: OBSTETRICS & GYNECOLOGY

## 2022-05-06 NOTE — PROGRESS NOTES
36 year old  at 38w2d     A+/RI. NIPT nl XX.  Reviewed s/s labor, reviewed when to call and where to present.  Reviewed IOL after 41 wks.     RTC weekly until delivery     Ml Joshi MD, MPH  [facility]

## 2022-05-06 NOTE — NURSING NOTE
"Chief Complaint   Patient presents with     Prenatal Care     38 4/7 weeks       Initial /70 (BP Location: Left arm, Patient Position: Chair, Cuff Size: Adult Regular)   Ht 1.74 m (5' 8.5\")   Wt 75.8 kg (167 lb)   LMP 2021   Breastfeeding No   BMI 25.02 kg/m   Estimated body mass index is 25.02 kg/m  as calculated from the following:    Height as of this encounter: 1.74 m (5' 8.5\").    Weight as of this encounter: 75.8 kg (167 lb).  BP completed using cuff size: regular    Questioned patient about current smoking habits.  Pt. has never smoked.          The following HM Due: NONE    +fetal movement  +++swelling  ???sleep disturbance    Darshana Olea, CMA    "

## 2022-05-12 ENCOUNTER — PRENATAL OFFICE VISIT (OUTPATIENT)
Dept: OBGYN | Facility: CLINIC | Age: 37
End: 2022-05-12
Payer: COMMERCIAL

## 2022-05-12 VITALS
HEART RATE: 77 BPM | DIASTOLIC BLOOD PRESSURE: 84 MMHG | SYSTOLIC BLOOD PRESSURE: 128 MMHG | WEIGHT: 168.2 LBS | BODY MASS INDEX: 25.2 KG/M2

## 2022-05-12 DIAGNOSIS — Z34.03 ENCOUNTER FOR SUPERVISION OF NORMAL FIRST PREGNANCY IN THIRD TRIMESTER: Primary | ICD-10-CM

## 2022-05-12 PROCEDURE — 99207 PR PRENATAL VISIT: CPT | Performed by: OBSTETRICS & GYNECOLOGY

## 2022-05-12 NOTE — PROGRESS NOTES
Doing well.   No complaints.   Denies VB, ctx, LOF. +FM  /84 (BP Location: Left arm, Cuff Size: Adult Regular)   Pulse 77   Wt 76.3 kg (168 lb 3.2 oz)   LMP 2021   Breastfeeding No   BMI 25.20 kg/m    General Appearance: NAD  Abdomen: Gravid, NT  Refer to flow sheet above.   A/P: 36 year old  at 39w1d  --    Modesta Vail MD  [facility]

## 2022-05-12 NOTE — NURSING NOTE
"Chief Complaint   Patient presents with     Prenatal Care     39 weeks 1 days   GBS=Neg        Initial /84 (BP Location: Left arm, Cuff Size: Adult Regular)   Pulse 77   Wt 76.3 kg (168 lb 3.2 oz)   LMP 2021   Breastfeeding No   BMI 25.20 kg/m   Estimated body mass index is 25.2 kg/m  as calculated from the following:    Height as of 22: 1.74 m (5' 8.5\").    Weight as of this encounter: 76.3 kg (168 lb 3.2 oz).  BP completed using cuff size: regular    Questioned patient about current smoking habits.  Pt. has never smoked.    39w1d        The following HM Due: NONE      +FM daily   +cramping   +swelling in calves and feet          Jill Cortes, CMA on 2022 at 8:57 AM               "

## 2022-05-16 ENCOUNTER — HOSPITAL ENCOUNTER (OUTPATIENT)
Age: 37
End: 2022-05-16
Payer: COMMERCIAL

## 2022-05-16 ENCOUNTER — PRENATAL OFFICE VISIT (OUTPATIENT)
Dept: OBGYN | Facility: CLINIC | Age: 37
End: 2022-05-16
Payer: COMMERCIAL

## 2022-05-16 VITALS — DIASTOLIC BLOOD PRESSURE: 72 MMHG | SYSTOLIC BLOOD PRESSURE: 118 MMHG | WEIGHT: 168 LBS | BODY MASS INDEX: 25.17 KG/M2

## 2022-05-16 DIAGNOSIS — Z34.00 SUPERVISION OF NORMAL FIRST PREGNANCY, ANTEPARTUM: Primary | ICD-10-CM

## 2022-05-16 PROCEDURE — 59426 ANTEPARTUM CARE ONLY: CPT | Performed by: OBSTETRICS & GYNECOLOGY

## 2022-05-16 PROCEDURE — 99207 PR PRENATAL VISIT: CPT | Performed by: OBSTETRICS & GYNECOLOGY

## 2022-05-16 NOTE — PATIENT INSTRUCTIONS
You can reach your [facility] Care Team any time of the day by calling [phone]. This number will put you in touch with the 24 hour nurse line if the clinic is closed.    To contact your OB/GYN Station Coordinator/Surgery Scheduler please call [phone]. This is a direct number for your care team between 8 a.m. and 4 p.m. Monday through Friday.    [facility] is open for your convenience:  Monday through Friday 8 a.m. to 6 p.m.  Closed weekends and all major holidays.

## 2022-05-16 NOTE — PROGRESS NOTES
Nabila Zapata is a 36 year old female, 39w5d, Estimated Date of Delivery: May 18, 2022 who presents for prenatal care.    Good fetal movement.  The patient went to a wedding last evening and on the way home developed a bitemporal headache.  She was able to get to sleep but then awoke at 2:00 this morning with a headache took 2 regular Tylenol and now notices that her headache pain is better.  She denies any visual change right upper quadrant discomfort bleeding or other related symptoms to suggest preeclampsia.  We discussed the pathophysiology of headaches &  the parameters for which she should call    A/P: Intrauterine pregnancy 39-5/7 weeks gestation otherwise doing well.  Signs and symptoms of labor discussed patient will call.  We will consider induction of labor if undelivered by 41 weeks

## 2022-05-19 ENCOUNTER — HOSPITAL ENCOUNTER (INPATIENT)
Facility: CLINIC | Age: 37
LOS: 2 days | Discharge: HOME | End: 2022-05-21
Attending: OBSTETRICS & GYNECOLOGY | Admitting: OBSTETRICS & GYNECOLOGY
Payer: COMMERCIAL

## 2022-05-19 ENCOUNTER — NURSE TRIAGE (OUTPATIENT)
Dept: NURSING | Facility: CLINIC | Age: 37
End: 2022-05-19
Payer: COMMERCIAL

## 2022-05-19 ENCOUNTER — ANESTHESIA EVENT (OUTPATIENT)
Dept: OBGYN | Facility: CLINIC | Age: 37
End: 2022-05-19
Payer: COMMERCIAL

## 2022-05-19 ENCOUNTER — ANESTHESIA (OUTPATIENT)
Dept: OBGYN | Facility: CLINIC | Age: 37
End: 2022-05-19
Payer: COMMERCIAL

## 2022-05-19 DIAGNOSIS — Z3A.40 40 WEEKS GESTATION OF PREGNANCY: ICD-10-CM

## 2022-05-19 LAB
ABO/RH(D): NORMAL
ANTIBODY SCREEN: NEGATIVE
BASOPHILS # BLD AUTO: 0.1 10E3/UL (ref 0–0.2)
BASOPHILS NFR BLD AUTO: 0 %
EOSINOPHIL # BLD AUTO: 0 10E3/UL (ref 0–0.7)
EOSINOPHIL NFR BLD AUTO: 0 %
ERYTHROCYTE [DISTWIDTH] IN BLOOD BY AUTOMATED COUNT: 11.9 % (ref 10–15)
HCT VFR BLD AUTO: 36.2 % (ref 35–47)
HGB BLD-MCNC: 12.1 G/DL (ref 11.7–15.7)
IMM GRANULOCYTES # BLD: 0.2 10E3/UL
IMM GRANULOCYTES NFR BLD: 1 %
LYMPHOCYTES # BLD AUTO: 1.1 10E3/UL (ref 0.8–5.3)
LYMPHOCYTES NFR BLD AUTO: 5 %
MCH RBC QN AUTO: 32 PG (ref 26.5–33)
MCHC RBC AUTO-ENTMCNC: 33.4 G/DL (ref 31.5–36.5)
MCV RBC AUTO: 96 FL (ref 78–100)
MONOCYTES # BLD AUTO: 0.9 10E3/UL (ref 0–1.3)
MONOCYTES NFR BLD AUTO: 4 %
NEUTROPHILS # BLD AUTO: 19.9 10E3/UL (ref 1.6–8.3)
NEUTROPHILS NFR BLD AUTO: 90 %
NRBC # BLD AUTO: 0 10E3/UL
NRBC BLD AUTO-RTO: 0 /100
PLATELET # BLD AUTO: 159 10E3/UL (ref 150–450)
RBC # BLD AUTO: 3.78 10E6/UL (ref 3.8–5.2)
RUPTURE OF FETAL MEMBRANES BY ROM PLUS: POSITIVE
SARS-COV-2 RNA RESP QL NAA+PROBE: NEGATIVE
SPECIMEN EXPIRATION DATE: NORMAL
T PALLIDUM AB SER QL: NONREACTIVE
WBC # BLD AUTO: 22.2 10E3/UL (ref 4–11)

## 2022-05-19 PROCEDURE — 258N000003 HC RX IP 258 OP 636: Performed by: OBSTETRICS & GYNECOLOGY

## 2022-05-19 PROCEDURE — 710N000010 HC RECOVERY PHASE 1, LEVEL 2, PER MIN: Performed by: OBSTETRICS & GYNECOLOGY

## 2022-05-19 PROCEDURE — 250N000011 HC RX IP 250 OP 636: Performed by: ANESTHESIOLOGY

## 2022-05-19 PROCEDURE — 36415 COLL VENOUS BLD VENIPUNCTURE: CPT | Performed by: OBSTETRICS & GYNECOLOGY

## 2022-05-19 PROCEDURE — 3E033VJ INTRODUCTION OF OTHER HORMONE INTO PERIPHERAL VEIN, PERCUTANEOUS APPROACH: ICD-10-PCS | Performed by: OBSTETRICS & GYNECOLOGY

## 2022-05-19 PROCEDURE — 86780 TREPONEMA PALLIDUM: CPT | Performed by: OBSTETRICS & GYNECOLOGY

## 2022-05-19 PROCEDURE — 86901 BLOOD TYPING SEROLOGIC RH(D): CPT | Performed by: OBSTETRICS & GYNECOLOGY

## 2022-05-19 PROCEDURE — 250N000013 HC RX MED GY IP 250 OP 250 PS 637: Performed by: OBSTETRICS & GYNECOLOGY

## 2022-05-19 PROCEDURE — 250N000011 HC RX IP 250 OP 636: Performed by: OBSTETRICS & GYNECOLOGY

## 2022-05-19 PROCEDURE — 250N000009 HC RX 250: Performed by: NURSE ANESTHETIST, CERTIFIED REGISTERED

## 2022-05-19 PROCEDURE — 360N000076 HC SURGERY LEVEL 3, PER MIN: Performed by: OBSTETRICS & GYNECOLOGY

## 2022-05-19 PROCEDURE — 85025 COMPLETE CBC W/AUTO DIFF WBC: CPT | Performed by: OBSTETRICS & GYNECOLOGY

## 2022-05-19 PROCEDURE — 84112 EVAL AMNIOTIC FLUID PROTEIN: CPT | Performed by: OBSTETRICS & GYNECOLOGY

## 2022-05-19 PROCEDURE — 59025 FETAL NON-STRESS TEST: CPT

## 2022-05-19 PROCEDURE — 250N000011 HC RX IP 250 OP 636: Performed by: NURSE ANESTHETIST, CERTIFIED REGISTERED

## 2022-05-19 PROCEDURE — 250N000009 HC RX 250: Performed by: OBSTETRICS & GYNECOLOGY

## 2022-05-19 PROCEDURE — 120N000012 HC R&B POSTPARTUM

## 2022-05-19 PROCEDURE — 3E0D7GC INTRODUCTION OF OTHER THERAPEUTIC SUBSTANCE INTO MOUTH AND PHARYNX, VIA NATURAL OR ARTIFICIAL OPENING: ICD-10-PCS | Performed by: OBSTETRICS & GYNECOLOGY

## 2022-05-19 PROCEDURE — 250N000009 HC RX 250: Performed by: ANESTHESIOLOGY

## 2022-05-19 PROCEDURE — U0003 INFECTIOUS AGENT DETECTION BY NUCLEIC ACID (DNA OR RNA); SEVERE ACUTE RESPIRATORY SYNDROME CORONAVIRUS 2 (SARS-COV-2) (CORONAVIRUS DISEASE [COVID-19]), AMPLIFIED PROBE TECHNIQUE, MAKING USE OF HIGH THROUGHPUT TECHNOLOGIES AS DESCRIBED BY CMS-2020-01-R: HCPCS | Performed by: OBSTETRICS & GYNECOLOGY

## 2022-05-19 PROCEDURE — 370N000017 HC ANESTHESIA TECHNICAL FEE, PER MIN: Performed by: OBSTETRICS & GYNECOLOGY

## 2022-05-19 PROCEDURE — 00HU33Z INSERTION OF INFUSION DEVICE INTO SPINAL CANAL, PERCUTANEOUS APPROACH: ICD-10-PCS | Performed by: OBSTETRICS & GYNECOLOGY

## 2022-05-19 PROCEDURE — 258N000003 HC RX IP 258 OP 636: Performed by: NURSE ANESTHETIST, CERTIFIED REGISTERED

## 2022-05-19 PROCEDURE — G0463 HOSPITAL OUTPT CLINIC VISIT: HCPCS | Mod: 25

## 2022-05-19 PROCEDURE — 272N000001 HC OR GENERAL SUPPLY STERILE: Performed by: OBSTETRICS & GYNECOLOGY

## 2022-05-19 RX ORDER — FENTANYL CITRATE 50 UG/ML
100 INJECTION, SOLUTION INTRAMUSCULAR; INTRAVENOUS ONCE
Status: DISCONTINUED | OUTPATIENT
Start: 2022-05-19 | End: 2022-05-19 | Stop reason: HOSPADM

## 2022-05-19 RX ORDER — OXYTOCIN/0.9 % SODIUM CHLORIDE 30/500 ML
340 PLASTIC BAG, INJECTION (ML) INTRAVENOUS CONTINUOUS PRN
Status: DISCONTINUED | OUTPATIENT
Start: 2022-05-19 | End: 2022-05-19 | Stop reason: HOSPADM

## 2022-05-19 RX ORDER — TRANEXAMIC ACID 10 MG/ML
1 INJECTION, SOLUTION INTRAVENOUS EVERY 30 MIN PRN
Status: DISCONTINUED | OUTPATIENT
Start: 2022-05-19 | End: 2022-05-21 | Stop reason: HOSPADM

## 2022-05-19 RX ORDER — OXYCODONE HYDROCHLORIDE 5 MG/1
5 TABLET ORAL EVERY 4 HOURS PRN
Status: DISCONTINUED | OUTPATIENT
Start: 2022-05-19 | End: 2022-05-21 | Stop reason: HOSPADM

## 2022-05-19 RX ORDER — CEFAZOLIN SODIUM 2 G/100ML
2 INJECTION, SOLUTION INTRAVENOUS
Status: COMPLETED | OUTPATIENT
Start: 2022-05-19 | End: 2022-05-19

## 2022-05-19 RX ORDER — NALOXONE HYDROCHLORIDE 0.4 MG/ML
0.4 INJECTION, SOLUTION INTRAMUSCULAR; INTRAVENOUS; SUBCUTANEOUS
Status: DISCONTINUED | OUTPATIENT
Start: 2022-05-19 | End: 2022-05-19 | Stop reason: HOSPADM

## 2022-05-19 RX ORDER — METOCLOPRAMIDE 10 MG/1
10 TABLET ORAL EVERY 6 HOURS PRN
Status: DISCONTINUED | OUTPATIENT
Start: 2022-05-19 | End: 2022-05-19 | Stop reason: HOSPADM

## 2022-05-19 RX ORDER — MISOPROSTOL 200 UG/1
400 TABLET ORAL
Status: DISCONTINUED | OUTPATIENT
Start: 2022-05-19 | End: 2022-05-21 | Stop reason: HOSPADM

## 2022-05-19 RX ORDER — NALBUPHINE HYDROCHLORIDE 20 MG/ML
2.5-5 INJECTION, SOLUTION INTRAMUSCULAR; INTRAVENOUS; SUBCUTANEOUS EVERY 6 HOURS PRN
Status: DISCONTINUED | OUTPATIENT
Start: 2022-05-19 | End: 2022-05-21 | Stop reason: HOSPADM

## 2022-05-19 RX ORDER — LIDOCAINE HCL/EPINEPHRINE/PF 2%-1:200K
VIAL (ML) INJECTION PRN
Status: DISCONTINUED | OUTPATIENT
Start: 2022-05-19 | End: 2022-05-19

## 2022-05-19 RX ORDER — OXYTOCIN/0.9 % SODIUM CHLORIDE 30/500 ML
100-340 PLASTIC BAG, INJECTION (ML) INTRAVENOUS CONTINUOUS PRN
Status: CANCELLED | OUTPATIENT
Start: 2022-05-19

## 2022-05-19 RX ORDER — OXYTOCIN 10 [USP'U]/ML
10 INJECTION, SOLUTION INTRAMUSCULAR; INTRAVENOUS
Status: DISCONTINUED | OUTPATIENT
Start: 2022-05-19 | End: 2022-05-19 | Stop reason: HOSPADM

## 2022-05-19 RX ORDER — KETOROLAC TROMETHAMINE 30 MG/ML
30 INJECTION, SOLUTION INTRAMUSCULAR; INTRAVENOUS
Status: DISCONTINUED | OUTPATIENT
Start: 2022-05-19 | End: 2022-05-20

## 2022-05-19 RX ORDER — ACETAMINOPHEN 325 MG/1
975 TABLET ORAL EVERY 6 HOURS
Status: DISCONTINUED | OUTPATIENT
Start: 2022-05-19 | End: 2022-05-21 | Stop reason: HOSPADM

## 2022-05-19 RX ORDER — NALOXONE HYDROCHLORIDE 0.4 MG/ML
0.4 INJECTION, SOLUTION INTRAMUSCULAR; INTRAVENOUS; SUBCUTANEOUS
Status: DISCONTINUED | OUTPATIENT
Start: 2022-05-19 | End: 2022-05-21 | Stop reason: HOSPADM

## 2022-05-19 RX ORDER — CARBOPROST TROMETHAMINE 250 UG/ML
250 INJECTION, SOLUTION INTRAMUSCULAR
Status: DISCONTINUED | OUTPATIENT
Start: 2022-05-19 | End: 2022-05-21 | Stop reason: HOSPADM

## 2022-05-19 RX ORDER — SODIUM CHLORIDE, SODIUM LACTATE, POTASSIUM CHLORIDE, CALCIUM CHLORIDE 600; 310; 30; 20 MG/100ML; MG/100ML; MG/100ML; MG/100ML
INJECTION, SOLUTION INTRAVENOUS CONTINUOUS
Status: DISCONTINUED | OUTPATIENT
Start: 2022-05-19 | End: 2022-05-19 | Stop reason: HOSPADM

## 2022-05-19 RX ORDER — PROCHLORPERAZINE MALEATE 5 MG
10 TABLET ORAL EVERY 6 HOURS PRN
Status: DISCONTINUED | OUTPATIENT
Start: 2022-05-19 | End: 2022-05-19 | Stop reason: HOSPADM

## 2022-05-19 RX ORDER — ONDANSETRON 4 MG/1
4 TABLET, ORALLY DISINTEGRATING ORAL EVERY 6 HOURS PRN
Status: DISCONTINUED | OUTPATIENT
Start: 2022-05-19 | End: 2022-05-19 | Stop reason: HOSPADM

## 2022-05-19 RX ORDER — CARBOPROST TROMETHAMINE 250 UG/ML
250 INJECTION, SOLUTION INTRAMUSCULAR
Status: DISCONTINUED | OUTPATIENT
Start: 2022-05-19 | End: 2022-05-19 | Stop reason: HOSPADM

## 2022-05-19 RX ORDER — TRANEXAMIC ACID 10 MG/ML
1 INJECTION, SOLUTION INTRAVENOUS EVERY 30 MIN PRN
Status: DISCONTINUED | OUTPATIENT
Start: 2022-05-19 | End: 2022-05-19 | Stop reason: HOSPADM

## 2022-05-19 RX ORDER — METHYLERGONOVINE MALEATE 0.2 MG/ML
200 INJECTION INTRAVENOUS
Status: DISCONTINUED | OUTPATIENT
Start: 2022-05-19 | End: 2022-05-19 | Stop reason: HOSPADM

## 2022-05-19 RX ORDER — FENTANYL CITRATE 50 UG/ML
INJECTION, SOLUTION INTRAMUSCULAR; INTRAVENOUS
Status: COMPLETED | OUTPATIENT
Start: 2022-05-19 | End: 2022-05-19

## 2022-05-19 RX ORDER — NALOXONE HYDROCHLORIDE 0.4 MG/ML
0.2 INJECTION, SOLUTION INTRAMUSCULAR; INTRAVENOUS; SUBCUTANEOUS
Status: DISCONTINUED | OUTPATIENT
Start: 2022-05-19 | End: 2022-05-21 | Stop reason: HOSPADM

## 2022-05-19 RX ORDER — METOCLOPRAMIDE 10 MG/1
10 TABLET ORAL EVERY 6 HOURS PRN
Status: DISCONTINUED | OUTPATIENT
Start: 2022-05-19 | End: 2022-05-21 | Stop reason: HOSPADM

## 2022-05-19 RX ORDER — PROCHLORPERAZINE MALEATE 10 MG
10 TABLET ORAL EVERY 6 HOURS PRN
Status: DISCONTINUED | OUTPATIENT
Start: 2022-05-19 | End: 2022-05-21 | Stop reason: HOSPADM

## 2022-05-19 RX ORDER — LIDOCAINE HYDROCHLORIDE AND EPINEPHRINE 15; 5 MG/ML; UG/ML
3 INJECTION, SOLUTION EPIDURAL
Status: DISCONTINUED | OUTPATIENT
Start: 2022-05-19 | End: 2022-05-19 | Stop reason: HOSPADM

## 2022-05-19 RX ORDER — OXYTOCIN/0.9 % SODIUM CHLORIDE 30/500 ML
1-24 PLASTIC BAG, INJECTION (ML) INTRAVENOUS CONTINUOUS
Status: DISCONTINUED | OUTPATIENT
Start: 2022-05-19 | End: 2022-05-19 | Stop reason: HOSPADM

## 2022-05-19 RX ORDER — DEXTROSE, SODIUM CHLORIDE, SODIUM LACTATE, POTASSIUM CHLORIDE, AND CALCIUM CHLORIDE 5; .6; .31; .03; .02 G/100ML; G/100ML; G/100ML; G/100ML; G/100ML
INJECTION, SOLUTION INTRAVENOUS CONTINUOUS
Status: DISCONTINUED | OUTPATIENT
Start: 2022-05-19 | End: 2022-05-21 | Stop reason: HOSPADM

## 2022-05-19 RX ORDER — BISACODYL 10 MG
10 SUPPOSITORY, RECTAL RECTAL DAILY PRN
Status: DISCONTINUED | OUTPATIENT
Start: 2022-05-21 | End: 2022-05-21 | Stop reason: HOSPADM

## 2022-05-19 RX ORDER — IBUPROFEN 400 MG/1
800 TABLET, FILM COATED ORAL
Status: DISCONTINUED | OUTPATIENT
Start: 2022-05-19 | End: 2022-05-20

## 2022-05-19 RX ORDER — MISOPROSTOL 200 UG/1
800 TABLET ORAL
Status: DISCONTINUED | OUTPATIENT
Start: 2022-05-19 | End: 2022-05-19 | Stop reason: HOSPADM

## 2022-05-19 RX ORDER — PROCHLORPERAZINE 25 MG
25 SUPPOSITORY, RECTAL RECTAL EVERY 12 HOURS PRN
Status: DISCONTINUED | OUTPATIENT
Start: 2022-05-19 | End: 2022-05-19 | Stop reason: HOSPADM

## 2022-05-19 RX ORDER — HYDROCORTISONE 25 MG/G
CREAM TOPICAL 3 TIMES DAILY PRN
Status: DISCONTINUED | OUTPATIENT
Start: 2022-05-19 | End: 2022-05-21 | Stop reason: HOSPADM

## 2022-05-19 RX ORDER — METOCLOPRAMIDE HYDROCHLORIDE 5 MG/ML
10 INJECTION INTRAMUSCULAR; INTRAVENOUS EVERY 6 HOURS PRN
Status: DISCONTINUED | OUTPATIENT
Start: 2022-05-19 | End: 2022-05-19 | Stop reason: HOSPADM

## 2022-05-19 RX ORDER — METHYLERGONOVINE MALEATE 0.2 MG/ML
200 INJECTION INTRAVENOUS
Status: DISCONTINUED | OUTPATIENT
Start: 2022-05-19 | End: 2022-05-21 | Stop reason: HOSPADM

## 2022-05-19 RX ORDER — LIDOCAINE 40 MG/G
CREAM TOPICAL
Status: DISCONTINUED | OUTPATIENT
Start: 2022-05-19 | End: 2022-05-21 | Stop reason: HOSPADM

## 2022-05-19 RX ORDER — PROCHLORPERAZINE 25 MG
25 SUPPOSITORY, RECTAL RECTAL EVERY 12 HOURS PRN
Status: DISCONTINUED | OUTPATIENT
Start: 2022-05-19 | End: 2022-05-21 | Stop reason: HOSPADM

## 2022-05-19 RX ORDER — OXYTOCIN/0.9 % SODIUM CHLORIDE 30/500 ML
340 PLASTIC BAG, INJECTION (ML) INTRAVENOUS CONTINUOUS PRN
Status: DISCONTINUED | OUTPATIENT
Start: 2022-05-19 | End: 2022-05-21 | Stop reason: HOSPADM

## 2022-05-19 RX ORDER — LIDOCAINE 40 MG/G
CREAM TOPICAL
Status: DISCONTINUED | OUTPATIENT
Start: 2022-05-19 | End: 2022-05-19 | Stop reason: HOSPADM

## 2022-05-19 RX ORDER — MODIFIED LANOLIN
OINTMENT (GRAM) TOPICAL
Status: DISCONTINUED | OUTPATIENT
Start: 2022-05-19 | End: 2022-05-21 | Stop reason: HOSPADM

## 2022-05-19 RX ORDER — OXYTOCIN 10 [USP'U]/ML
10 INJECTION, SOLUTION INTRAMUSCULAR; INTRAVENOUS
Status: DISCONTINUED | OUTPATIENT
Start: 2022-05-19 | End: 2022-05-21 | Stop reason: HOSPADM

## 2022-05-19 RX ORDER — MISOPROSTOL 100 UG/1
25 TABLET ORAL
Status: DISCONTINUED | OUTPATIENT
Start: 2022-05-19 | End: 2022-05-19 | Stop reason: HOSPADM

## 2022-05-19 RX ORDER — ONDANSETRON 4 MG/1
4 TABLET, ORALLY DISINTEGRATING ORAL EVERY 6 HOURS PRN
Status: DISCONTINUED | OUTPATIENT
Start: 2022-05-19 | End: 2022-05-21 | Stop reason: HOSPADM

## 2022-05-19 RX ORDER — MISOPROSTOL 200 UG/1
400 TABLET ORAL
Status: DISCONTINUED | OUTPATIENT
Start: 2022-05-19 | End: 2022-05-19 | Stop reason: HOSPADM

## 2022-05-19 RX ORDER — ONDANSETRON 2 MG/ML
4 INJECTION INTRAMUSCULAR; INTRAVENOUS EVERY 6 HOURS PRN
Status: DISCONTINUED | OUTPATIENT
Start: 2022-05-19 | End: 2022-05-19 | Stop reason: HOSPADM

## 2022-05-19 RX ORDER — SODIUM CHLORIDE, SODIUM LACTATE, POTASSIUM CHLORIDE, CALCIUM CHLORIDE 600; 310; 30; 20 MG/100ML; MG/100ML; MG/100ML; MG/100ML
INJECTION, SOLUTION INTRAVENOUS CONTINUOUS PRN
Status: DISCONTINUED | OUTPATIENT
Start: 2022-05-19 | End: 2022-05-19 | Stop reason: HOSPADM

## 2022-05-19 RX ORDER — AMOXICILLIN 250 MG
1 CAPSULE ORAL 2 TIMES DAILY
Status: DISCONTINUED | OUTPATIENT
Start: 2022-05-19 | End: 2022-05-21 | Stop reason: HOSPADM

## 2022-05-19 RX ORDER — IBUPROFEN 400 MG/1
800 TABLET, FILM COATED ORAL EVERY 6 HOURS
Status: DISCONTINUED | OUTPATIENT
Start: 2022-05-20 | End: 2022-05-20

## 2022-05-19 RX ORDER — LIDOCAINE HYDROCHLORIDE AND EPINEPHRINE 15; 5 MG/ML; UG/ML
INJECTION, SOLUTION EPIDURAL PRN
Status: DISCONTINUED | OUTPATIENT
Start: 2022-05-19 | End: 2022-05-19

## 2022-05-19 RX ORDER — TERBUTALINE SULFATE 1 MG/ML
0.25 INJECTION, SOLUTION SUBCUTANEOUS
Status: DISCONTINUED | OUTPATIENT
Start: 2022-05-19 | End: 2022-05-19 | Stop reason: HOSPADM

## 2022-05-19 RX ORDER — NALOXONE HYDROCHLORIDE 0.4 MG/ML
0.2 INJECTION, SOLUTION INTRAMUSCULAR; INTRAVENOUS; SUBCUTANEOUS
Status: DISCONTINUED | OUTPATIENT
Start: 2022-05-19 | End: 2022-05-19 | Stop reason: HOSPADM

## 2022-05-19 RX ORDER — METOCLOPRAMIDE HYDROCHLORIDE 5 MG/ML
10 INJECTION INTRAMUSCULAR; INTRAVENOUS EVERY 6 HOURS PRN
Status: DISCONTINUED | OUTPATIENT
Start: 2022-05-19 | End: 2022-05-21 | Stop reason: HOSPADM

## 2022-05-19 RX ORDER — EPHEDRINE SULFATE 50 MG/ML
5 INJECTION, SOLUTION INTRAMUSCULAR; INTRAVENOUS; SUBCUTANEOUS
Status: DISCONTINUED | OUTPATIENT
Start: 2022-05-19 | End: 2022-05-19 | Stop reason: HOSPADM

## 2022-05-19 RX ORDER — OXYTOCIN 10 [USP'U]/ML
10 INJECTION, SOLUTION INTRAMUSCULAR; INTRAVENOUS
Status: CANCELLED | OUTPATIENT
Start: 2022-05-19

## 2022-05-19 RX ORDER — AZITHROMYCIN 500 MG/1
500 INJECTION, POWDER, LYOPHILIZED, FOR SOLUTION INTRAVENOUS
Status: COMPLETED | OUTPATIENT
Start: 2022-05-19 | End: 2022-05-19

## 2022-05-19 RX ORDER — CITRIC ACID/SODIUM CITRATE 334-500MG
30 SOLUTION, ORAL ORAL
Status: COMPLETED | OUTPATIENT
Start: 2022-05-19 | End: 2022-05-19

## 2022-05-19 RX ORDER — AMOXICILLIN 250 MG
2 CAPSULE ORAL 2 TIMES DAILY
Status: DISCONTINUED | OUTPATIENT
Start: 2022-05-19 | End: 2022-05-21 | Stop reason: HOSPADM

## 2022-05-19 RX ORDER — MISOPROSTOL 200 UG/1
800 TABLET ORAL
Status: DISCONTINUED | OUTPATIENT
Start: 2022-05-19 | End: 2022-05-21 | Stop reason: HOSPADM

## 2022-05-19 RX ORDER — CEFAZOLIN SODIUM 2 G/100ML
2 INJECTION, SOLUTION INTRAVENOUS SEE ADMIN INSTRUCTIONS
Status: DISCONTINUED | OUTPATIENT
Start: 2022-05-19 | End: 2022-05-19 | Stop reason: HOSPADM

## 2022-05-19 RX ORDER — MORPHINE SULFATE 1 MG/ML
INJECTION, SOLUTION EPIDURAL; INTRATHECAL; INTRAVENOUS PRN
Status: DISCONTINUED | OUTPATIENT
Start: 2022-05-19 | End: 2022-05-19

## 2022-05-19 RX ORDER — SIMETHICONE 80 MG
80 TABLET,CHEWABLE ORAL 4 TIMES DAILY PRN
Status: DISCONTINUED | OUTPATIENT
Start: 2022-05-19 | End: 2022-05-21 | Stop reason: HOSPADM

## 2022-05-19 RX ORDER — OXYTOCIN 10 [USP'U]/ML
10 INJECTION, SOLUTION INTRAMUSCULAR; INTRAVENOUS
Status: DISCONTINUED | OUTPATIENT
Start: 2022-05-19 | End: 2022-05-20

## 2022-05-19 RX ORDER — ONDANSETRON 2 MG/ML
INJECTION INTRAMUSCULAR; INTRAVENOUS PRN
Status: DISCONTINUED | OUTPATIENT
Start: 2022-05-19 | End: 2022-05-19

## 2022-05-19 RX ORDER — CITRIC ACID/SODIUM CITRATE 334-500MG
30 SOLUTION, ORAL ORAL
Status: DISCONTINUED | OUTPATIENT
Start: 2022-05-19 | End: 2022-05-19 | Stop reason: HOSPADM

## 2022-05-19 RX ORDER — KETOROLAC TROMETHAMINE 30 MG/ML
30 INJECTION, SOLUTION INTRAMUSCULAR; INTRAVENOUS EVERY 6 HOURS PRN
Status: COMPLETED | OUTPATIENT
Start: 2022-05-20 | End: 2022-05-20

## 2022-05-19 RX ORDER — OXYTOCIN/0.9 % SODIUM CHLORIDE 30/500 ML
100-340 PLASTIC BAG, INJECTION (ML) INTRAVENOUS CONTINUOUS PRN
Status: DISCONTINUED | OUTPATIENT
Start: 2022-05-19 | End: 2022-05-20

## 2022-05-19 RX ORDER — ROPIVACAINE HYDROCHLORIDE 2 MG/ML
10 INJECTION, SOLUTION EPIDURAL; INFILTRATION; PERINEURAL ONCE
Status: COMPLETED | OUTPATIENT
Start: 2022-05-19 | End: 2022-05-19

## 2022-05-19 RX ORDER — FENTANYL CITRATE 50 UG/ML
100 INJECTION, SOLUTION INTRAMUSCULAR; INTRAVENOUS
Status: DISCONTINUED | OUTPATIENT
Start: 2022-05-19 | End: 2022-05-19 | Stop reason: HOSPADM

## 2022-05-19 RX ORDER — ONDANSETRON 2 MG/ML
4 INJECTION INTRAMUSCULAR; INTRAVENOUS EVERY 6 HOURS PRN
Status: DISCONTINUED | OUTPATIENT
Start: 2022-05-19 | End: 2022-05-21 | Stop reason: HOSPADM

## 2022-05-19 RX ADMIN — LIDOCAINE HYDROCHLORIDE,EPINEPHRINE BITARTRATE 10 ML: 20; .005 INJECTION, SOLUTION EPIDURAL; INFILTRATION; INTRACAUDAL; PERINEURAL at 19:06

## 2022-05-19 RX ADMIN — Medication: at 13:21

## 2022-05-19 RX ADMIN — Medication 340 ML/HR: at 19:31

## 2022-05-19 RX ADMIN — LIDOCAINE HYDROCHLORIDE,EPINEPHRINE BITARTRATE 10 ML: 20; .005 INJECTION, SOLUTION EPIDURAL; INFILTRATION; INTRACAUDAL; PERINEURAL at 18:53

## 2022-05-19 RX ADMIN — PHENYLEPHRINE HYDROCHLORIDE 100 MCG: 10 INJECTION INTRAVENOUS at 19:49

## 2022-05-19 RX ADMIN — SODIUM CHLORIDE, POTASSIUM CHLORIDE, SODIUM LACTATE AND CALCIUM CHLORIDE 1000 ML: 600; 310; 30; 20 INJECTION, SOLUTION INTRAVENOUS at 12:19

## 2022-05-19 RX ADMIN — PHENYLEPHRINE HYDROCHLORIDE 100 MCG: 10 INJECTION INTRAVENOUS at 19:11

## 2022-05-19 RX ADMIN — FENTANYL CITRATE 100 MCG: 50 INJECTION, SOLUTION INTRAMUSCULAR; INTRAVENOUS at 11:17

## 2022-05-19 RX ADMIN — MISOPROSTOL 25 MCG: 100 TABLET ORAL at 08:19

## 2022-05-19 RX ADMIN — LIDOCAINE HYDROCHLORIDE AND EPINEPHRINE 3 ML: 15; 5 INJECTION, SOLUTION EPIDURAL at 13:19

## 2022-05-19 RX ADMIN — ONDANSETRON 4 MG: 2 INJECTION INTRAMUSCULAR; INTRAVENOUS at 19:06

## 2022-05-19 RX ADMIN — SODIUM CHLORIDE, POTASSIUM CHLORIDE, SODIUM LACTATE AND CALCIUM CHLORIDE: 600; 310; 30; 20 INJECTION, SOLUTION INTRAVENOUS at 19:34

## 2022-05-19 RX ADMIN — PHENYLEPHRINE HYDROCHLORIDE 0.5 MCG/KG/MIN: 10 INJECTION INTRAVENOUS at 19:09

## 2022-05-19 RX ADMIN — AZITHROMYCIN MONOHYDRATE 500 MG: 500 INJECTION, POWDER, LYOPHILIZED, FOR SOLUTION INTRAVENOUS at 19:18

## 2022-05-19 RX ADMIN — PHENYLEPHRINE HYDROCHLORIDE 100 MCG: 10 INJECTION INTRAVENOUS at 19:10

## 2022-05-19 RX ADMIN — FENTANYL CITRATE 100 MCG: 50 INJECTION, SOLUTION INTRAMUSCULAR; INTRAVENOUS at 13:25

## 2022-05-19 RX ADMIN — PHENYLEPHRINE HYDROCHLORIDE 200 MCG: 10 INJECTION INTRAVENOUS at 19:23

## 2022-05-19 RX ADMIN — MISOPROSTOL 25 MCG: 100 TABLET ORAL at 05:31

## 2022-05-19 RX ADMIN — PHENYLEPHRINE HYDROCHLORIDE 100 MCG: 10 INJECTION INTRAVENOUS at 19:33

## 2022-05-19 RX ADMIN — PHENYLEPHRINE HYDROCHLORIDE 100 MCG: 10 INJECTION INTRAVENOUS at 19:26

## 2022-05-19 RX ADMIN — PHENYLEPHRINE HYDROCHLORIDE 100 MCG: 10 INJECTION INTRAVENOUS at 19:50

## 2022-05-19 RX ADMIN — CEFAZOLIN SODIUM 2 G: 2 INJECTION, SOLUTION INTRAVENOUS at 19:12

## 2022-05-19 RX ADMIN — MORPHINE SULFATE 2 MG: 1 INJECTION, SOLUTION EPIDURAL; INTRATHECAL; INTRAVENOUS at 20:06

## 2022-05-19 RX ADMIN — ROPIVACAINE HYDROCHLORIDE 10 ML: 2 INJECTION, SOLUTION EPIDURAL; INFILTRATION at 13:20

## 2022-05-19 RX ADMIN — PHENYLEPHRINE HYDROCHLORIDE 200 MCG: 10 INJECTION INTRAVENOUS at 20:07

## 2022-05-19 RX ADMIN — PHENYLEPHRINE HYDROCHLORIDE 100 MCG: 10 INJECTION INTRAVENOUS at 19:21

## 2022-05-19 RX ADMIN — MISOPROSTOL 800 MCG: 200 TABLET ORAL at 21:44

## 2022-05-19 RX ADMIN — SODIUM CHLORIDE, POTASSIUM CHLORIDE, SODIUM LACTATE AND CALCIUM CHLORIDE: 600; 310; 30; 20 INJECTION, SOLUTION INTRAVENOUS at 13:48

## 2022-05-19 RX ADMIN — PHENYLEPHRINE HYDROCHLORIDE 200 MCG: 10 INJECTION INTRAVENOUS at 19:53

## 2022-05-19 RX ADMIN — SODIUM CITRATE AND CITRIC ACID MONOHYDRATE 30 ML: 500; 334 SOLUTION ORAL at 18:32

## 2022-05-19 RX ADMIN — Medication 2 MILLI-UNITS/MIN: at 15:50

## 2022-05-19 RX ADMIN — PHENYLEPHRINE HYDROCHLORIDE 100 MCG: 10 INJECTION INTRAVENOUS at 19:18

## 2022-05-19 RX ADMIN — KETOROLAC TROMETHAMINE 30 MG: 30 INJECTION, SOLUTION INTRAMUSCULAR; INTRAVENOUS at 21:52

## 2022-05-19 RX ADMIN — SODIUM CHLORIDE, SODIUM LACTATE, POTASSIUM CHLORIDE, CALCIUM CHLORIDE AND DEXTROSE MONOHYDRATE: 5; 600; 310; 30; 20 INJECTION, SOLUTION INTRAVENOUS at 22:11

## 2022-05-19 RX ADMIN — ACETAMINOPHEN 975 MG: 325 TABLET ORAL at 21:50

## 2022-05-19 NOTE — PROVIDER NOTIFICATION
05/19/22 1520   Provider Notification   Provider Name/Title Dr. King   Method of Notification At Bedside   Notification Reason Uterine Activity;Labor Status;Maternal Vital Sign Change;Status Update;SVE  (Provider to bedside for pt status update, SVE, & also notified of maternal HR up to 130's at times.)

## 2022-05-19 NOTE — PROVIDER NOTIFICATION
05/19/22 1250   Provider Notification   Provider Name/Title Dr. Schwab   Method of Notification At Bedside   Notification Reason Status Update;SVE;Labor Status  (Provider to bedside to assess vaginal bleeding, SVE, & for pt status update.)

## 2022-05-19 NOTE — ANESTHESIA PROCEDURE NOTES
Epidural catheter Procedure Note    Pre-Procedure   Staff -        Anesthesiologist:  Pastora Quintero MD       Performed By: anesthesiologist       Location: OB       Pre-Anesthestic Checklist: patient identified, IV checked, risks and benefits discussed, informed consent, monitors and equipment checked, pre-op evaluation and at physician/surgeon's request  Timeout:       Correct Patient: Yes        Correct Procedure: Yes        Correct Site: Yes        Correct Position: Yes   Procedure Documentation  Procedure: epidural catheter       Diagnosis: Labor Pain       Patient Position: sitting       Patient Prep/Sterile Barriers: sterile gloves, mask, patient draped, x3       Skin prep: Betadine       Local skin infiltrated with mL of 1% lidocaine.        Insertion Site: L3-4. (midline approach).       Technique: LORT saline        MEGA at 5 cm.       Needle Type: Universal Ady needle       Needle Gauge: 17.        Needle Length (Inches): 5        Catheter: 19 G.          Catheter threaded easily.         3 cm epidural space.         Threaded 8.5 cm at skin.         # of attempts: 1 and  # of redirects:     Assessment/Narrative         Paresthesias: No.       Test dose of 3 mL lidocaine 1.5% w/ 1:200,000 epinephrine at.         Test dose negative, 3 minutes after injection, for signs of intravascular, subdural, or intrathecal injection.       Insertion/Infusion Method: LORT saline       Aspiration negative for Heme or CSF via Epidural Catheter.    Medication(s) Administered   ROPivacaine (NAROPIN) injection 10 mL - EPIDURAL   10 mL - 5/19/2022 1:20:00 PM  Fentanyl PF (Epidural) - EPIDURAL   100 mcg - 5/19/2022 1:25:00 PM   Comments:  Catheter secured with adhesive spray, tegaderm, and tape.

## 2022-05-19 NOTE — PROVIDER NOTIFICATION
05/19/22 0815   Provider Notification   Provider Name/Title Dr. King   Method of Notification Phone   Request Evaluate - Remote   Notification Reason Labor Status;Uterine Activity;Pain;Status Update  (Provider updated on pts report of mild cramping, as well as contractions lasting up to 5 mins at times. T.O.R.B. received to proceed with PO Cytotec at this time.)

## 2022-05-19 NOTE — PROVIDER NOTIFICATION
05/19/22 1241   Provider Notification   Provider Name/Title Dr. Schwab   Method of Notification Phone   Request Evaluate in Person   Notification Reason Bleeding  (Provider called to come assess moderate amount of bright red vaginal bleeding with passing several small clots.)

## 2022-05-19 NOTE — PROVIDER NOTIFICATION
05/19/22 1005   Provider Notification   Provider Name/Title Dr. King   Method of Notification At Bedside   Notification Reason Labor Status;Uterine Activity;Pain;Variability Change;Status Update;SVE  (Provider to bedside for status update, SVE, & to discuss plans for cares. Provider updated on period of minimal variability from 9042-1430. Plan is to recheck SVE by 1230 & begin IV Oxytocin if no change at that time. Pt verbalizes agreement.)

## 2022-05-19 NOTE — PROGRESS NOTES
OB Labor Progress Note    S:  Comfortable with epidural    O:  Afebrile, vss (/76)  FHTs:  Baseline 130. Moderate variability. +accelerations. No decelerations. Reactive and reassuring (Category 1)  TOCO:  CTX Q 1-3 minutes.  SVE:  Cervix 7 cm/80%/-1    A: 36 year old  at 40W 1D  SROM at 0115 earlier this morning. Not in labor on admission. Therefore oral misoprostol started. Now in active labor. Comfortable with epidural. Some vaginal bleeding likely due to rapid cervical change.  GBS negative  FWB reassuring  Covid 19 negative on admission     P:  Continue to monitor  Recheck cervix in about an hour.    Titi King MD

## 2022-05-19 NOTE — PROGRESS NOTES
OB Labor Progress Note    S:  Comfortable with epidural    O:  Afebrile, vss (/53)  FHTs:  Baseline 130. Moderate variability. +accelerations. No decelerations. Reactive and reassuring (Category 1)  TOCO:  CTX Q 2-5 minutes.  SVE:  Cervix 7+ cm/80%/-1 (unchanged)    A: 36 year old  at 40W 1D  SROM at 0115 earlier this morning. Not in labor on admission. Therefore oral misoprostol started. Now in active labor. Comfortable with epidural. Some vaginal bleeding right before epidural placement likely due to rapid cervical change. No longer bleeding. No cervical change over the last 3 hours.  GBS negative  FWB reassuring  Covid 19 negative on admission     P:  Start Pitocin for labor augmentation  Recheck cervix again in about 2 hours    Titi King MD

## 2022-05-19 NOTE — CARE PLAN
Data: Patient is a . Prenatal record reviewed.   OB History    Para Term  AB Living   1 0 0 0 0 0   SAB IAB Ectopic Multiple Live Births   0 0 0 0 0      # Outcome Date GA Lbr Josh/2nd Weight Sex Delivery Anes PTL Lv   1 Current            .  Medical History:   Past Medical History:   Diagnosis Date     Abnormal Pap smear of cervix 2009     Endocrine disease      Fibroid      LSIL (low grade squamous intraepithelial lesion) on Pap smear 2010    STEPHANIE I on colp 2010     SIADH (syndrome of inappropriate ADH production) (H) 2011    hospitalized. will see Endocrinologist     Varicella    .  Gestational age 40w1d. Vital signs per doc flowsheet. Fetal movement present. Patient reports Laboring   as reason for admission. Support persons AJ present.  Action: Report from JERRELL Sun obtained at 1530. Care of patient assumed at 1530. Verbal consent for EFM, external fetal monitors applied. Admission assessment completed. Patient and support persons educated on labor process. Patient instructed to report change in fetal movement, contractions, vaginal leaking of fluid or bleeding, abdominal pain, or any concerns related to the pregnancy to her nurse/physician. Patient oriented to room, call light in reach.   Response: Dr. King at bedside at 1500 for SVE, no cervical change, orders to start pitocin, MD aware of minimal variability, cbc ordered. Patient verbalized understanding of education and verbalized agreement with plan. Patient coping with labor via epidural and is comfortable.     22 1603   Provider Notification   Provider Name/Title Dr King   Method of Notification In Department   Request Evaluate - Remote   Notification Reason Lab/Diagnostic Study  (add on or draw cbc)      22 1650   Provider Notification   Provider Name/Title Dr King   Method of Notification At Bedside   Request Evaluate in Person   Notification Reason Status Update  (handing off to Dr Singh)       05/19/22 1715   Provider Notification   Provider Name/Title Dr Singh   Method of Notification Phone   Request Evaluate - Remote   Notification Reason Status Update  (MD will be by in 15-20 min to SVE pt)      05/19/22 1740   Vaginal Exam   Method sterile exam per physician   Cervical Dilation (cm) 7   Cervical Effacement %   Fetal Presentation cephalic      05/19/22 1740   Provider Notification   Provider Name/Title Dr Singh   Method of Notification At Bedside   Request Evaluate in Person   Notification Reason SVE;Status Update; MD at bedside extensively talking about IUPC vs c/s, see MD note      05/19/22 1810   Comments   Comments c/s consent signed     1920 pt ready in OR, Dr Singh arriving,  handoff to Jerson Sigala, RN

## 2022-05-19 NOTE — H&P
"  May 19, 2022    Nabila Zapata  [id number]            OB Admit History & Physical      Chief Complaint: leaking fluid    History of Present Illness:  36 year old  at 40W 1D.  Estimated gestational age by LMP with EDC 2018 consistent with 7 week ultrasound.  Pregnancy uncomplicated.  Normal/negative NIPT (having a girl).  20 weeks ultrasound demonstrated: isolated cardiac echogenic focus.  GBS negative (2022).  A positive. Antibody negative.  History of placenta previa but resolved per 24 week ultrasound.    Patient presented for evaluation earlier this morning. She started feeling leaking fluid around 0115.  Ruptured membranes was confirmed [place] [facility]  She was not in labor.  Cervix 2 cm/50/-2.    Since arrival, she was started on oral misoprostol.  She now appears to be entering into active labor.    Estimated body mass index is 25.54 kg/m  as calculated from the following:    Height as of this encounter: 1.727 m (5' 8\").    Weight as of this encounter: 76.2 kg (168 lb).      Estimated fetal weight= 3400 grams       She is a 36 year old   Her OB history:   OB History    Para Term  AB Living   1 0 0 0 0 0   SAB IAB Ectopic Multiple Live Births   0 0 0 0 0      # Outcome Date GA Lbr Josh/2nd Weight Sex Delivery Anes PTL Lv   1 Current                     Past Medical History:   Diagnosis Date     Abnormal Pap smear of cervix 2009     Endocrine disease      Fibroid      LSIL (low grade squamous intraepithelial lesion) on Pap smear ,     STEPHANIE I on colp 2010     SIADH (syndrome of inappropriate ADH production) (H) 2011    hospitalized. will see Endocrinologist     Varicella           Past Surgical History:   Procedure Laterality Date     DILATION AND CURETTAGE, HYSTEROSCOPY DIAGNOSTIC, COMBINED N/A 2016    Procedure: COMBINED DILATION AND CURETTAGE, HYSTEROSCOPY DIAGNOSTIC;  Surgeon: Aj Clemons MD;  Location:  OR     SURGICAL HISTORY OF -   " colposcopy; mild dysplasia; no treatment.     SURGICAL HISTORY OF -   1/11    colposcopy; recommended repeat         No current outpatient medications on file.       Allergies: Patient has no known allergies.      REVIEW OF SYSTEMS:  NEUROLOGIC:  Negative  EYES:  Negative  ENT:  Negative  GI:  Negative  BREAST:  Negative  :  Negative  GYN:  Negative  CV:  Negative  PULMONARY:  Negative  MUSCULOSKELETAL:  Negative  PSYCH:  Negative        Social History     Socioeconomic History     Marital status:      Spouse name: AP Gee     Number of children: Not on file     Years of education: Not on file     Highest education level: Master's degree (e.g., MA, MS, Ethan, MEd, MSW, RACHAEL)   Occupational History     Occupation:    Tobacco Use     Smoking status: Former Smoker     Packs/day: 1.00     Years: 4.00     Pack years: 4.00     Smokeless tobacco: Never Used   Vaping Use     Vaping Use: Never used   Substance and Sexual Activity     Alcohol use: Not Currently     Alcohol/week: 4.2 standard drinks     Types: 5 Standard drinks or equivalent per week     Comment: 1 glass of wine daily     Drug use: No     Sexual activity: Yes     Partners: Male   Other Topics Concern     Parent/sibling w/ CABG, MI or angioplasty before 65F 55M? No   Social History Narrative     Not on file     Social Determinants of Health     Financial Resource Strain: Not on file   Food Insecurity: Not on file   Transportation Needs: Not on file   Physical Activity: Not on file   Stress: Not on file   Social Connections: Not on file   Intimate Partner Violence: Not on file   Housing Stability: Not on file      Family History   Problem Relation Age of Onset     Cancer Mother         melanoma     Heart Disease Mother         murmur     Thyroid Disease Mother      Aneurysm Father         brain calcified aneurysm     Polycystic ovary syndrome Sister      Rheumatoid Arthritis Sister      C.A.D. Paternal Grandmother         valve  replacement     Breast Cancer Paternal Grandmother      C.A.D. Paternal Grandfather         triple bypass     Prostate Cancer Paternal Grandfather      Diabetes Other         mom's side of family; not immediate             Vitals:   T 98.5  P 85  /76  BMI 25.54 kg/m2  FHTs: Baseline 130. Moderate variability. +accelerations. No decelerations. Reactive and reassuring (Category 1).  TOCO:  CTX Q 1-3 minutes    General: Alert Awake in NAD  HEENT: grossly normal  Neck: no lymphadenopathy or thryoidomegaly  Lungs: clear  Back: no spinal or CVAT  Heart: S1 S2 RRR  Abdomen: soft, gravid  Cervix: 2 cm/70%/-1  EXT:  no edema or calf tenderness  Neuro: CN 2-12 grossly intact    Assessment:  36 year old  at 40W 1D  SROM at 0115 earlier this morning. Not in labor. Therefore oral misoprostol started. Appears to be entering into active labor.  GBS negative  FWB reassuring  Covid 19 negative on admission    Plan:  Admit  Re-check cervix in about 2 hours.  Consider starting Pitocin if she demonstrates lack of cervical change.      Titi King MD  May 19, 2022

## 2022-05-19 NOTE — PROVIDER NOTIFICATION
05/19/22 1224   Provider Notification   Provider Name/Title Dr. King   Method of Notification Electronic Page   Request Evaluate - Remote   Notification Reason Labor Status;Uterine Activity;Pain;Status Update;SVE  (Provider updated that pt iker Q 2-3 min now, is very uncomfortable & requesting epidural & SVE.)

## 2022-05-19 NOTE — TELEPHONE ENCOUNTER
"OB Triage Call      Is patient's OB/Midwife with the [facility] or [facility]? [facility]- Proceed with triage     Reason for call: Thinks water broke 40w1d     Assessment:   Laying in bed, rolled over and felt fluid coming out  Got up and urinated and then after that even more came out   Still having a little trickle after    Happened at 1:30am  Clear and slightly cloudy   Small gush at first and then more of a trickle in toilet and after  No odor     Has been having some cramping for the last week    Had bloody mucus yesterday   -pink, with some streaks of blood   -small amount     Baby moving normal     No fever  No contractions  No abdominal pain   No vaginal bleeding   No new swelling   No headache  No change in vision     Plan: Triaged to disposition of Go to L&D. They will be there in 45 min    Patient plans to deliver at [facility]     Patient's primary OB Provider is Dr. Vail.      Per protocol recommendations Patient to be evaluated in L&D. Patient's primary OB is [facility].  Labor and delivery at [facility] ([phone]) notified of patient's pending arrival.  Report given to JERRELL Ni.    Is patient's delivering hospital on divert? No      40w1d    Estimated Date of Delivery: May 18, 2022        OB History    Para Term  AB Living   1 0 0 0 0 0   SAB IAB Ectopic Multiple Live Births   0 0 0 0 0      # Outcome Date GA Lbr Josh/2nd Weight Sex Delivery Anes PTL Lv   1 Current                No results found for: GBS       Deja Mckinney RN 22 1:47 AM  [facility] Nurse Advisor    Reason for Disposition    Leakage of fluid from vagina    Additional Information    Negative: [1] SEVERE abdominal pain (e.g., excruciating) AND [2] constant AND [3] present > 1 hour    Negative: Severe bleeding (e.g., continuous red blood from vagina, or large blood clots)    Negative: Umbilical cord hanging out of the vagina (shiny, white, curled appearance, \"like telephone cord\")    Negative: " Uncontrollable urge to push (i.e., feels like baby is coming out now)    Negative: Can see baby    Negative: Sounds like a life-threatening emergency to the triager    Negative: < 20 weeks pregnant    Negative: Vaginal bleeding    Protocols used: PREGNANCY - RUPTURE OF [id number]-MB    Deja Mckinney RN on 5/19/2022 at 2:07 AM

## 2022-05-19 NOTE — PROGRESS NOTES
S Triage/Admission  B: , 40.1 wks, Labs A +, Hep B Neg, HIV Neg, Rubella Immune. Uncomplicated pregnancy. Pt arrived complaining of leaking of fluid since 0115. No fluid seen upon exam, ROM plus collected.  A. EFM and toco applied. SVE was 2/50/-2. ROM plus resulted positive. Pt moved to room 214.   R: report to Jerson ALLAN, cares relinquished. Continue intrapartum plan of care.

## 2022-05-19 NOTE — PROVIDER NOTIFICATION
05/19/22 1332   Provider Notification   Provider Name/Title Dr. King   Method of Notification At Bedside   Notification Reason Labor Status;Uterine Activity;Pain;Bleeding;Status Update;SVE  (Provider to bedside for SVE, pt status update, & to again assess vaginal bleeding.)

## 2022-05-19 NOTE — PROGRESS NOTES
OB progress note    Patient in pain with contractions, requesting epidural    I was called due to increased vaginal bleeding noted with last exam.    SVE +small active bleeding  7/80/-1     moderate variability +accelerations no decelerations    [place] contractions every 2-4 minutes    A/P: 36 year old G1 at 40w1d, active labor    Category 1 reactive FHR; vaginal bleeding likely due to rapid cervical change.    Will continue to monitor    Anesthesiology notified for epidural    Jennifer L. Schwab, MD

## 2022-05-20 LAB — HGB BLD-MCNC: 9.6 G/DL (ref 11.7–15.7)

## 2022-05-20 PROCEDURE — 36415 COLL VENOUS BLD VENIPUNCTURE: CPT | Performed by: OBSTETRICS & GYNECOLOGY

## 2022-05-20 PROCEDURE — 250N000013 HC RX MED GY IP 250 OP 250 PS 637: Performed by: OBSTETRICS & GYNECOLOGY

## 2022-05-20 PROCEDURE — 120N000012 HC R&B POSTPARTUM

## 2022-05-20 PROCEDURE — 85018 HEMOGLOBIN: CPT | Performed by: OBSTETRICS & GYNECOLOGY

## 2022-05-20 PROCEDURE — 250N000011 HC RX IP 250 OP 636: Performed by: OBSTETRICS & GYNECOLOGY

## 2022-05-20 RX ORDER — IBUPROFEN 400 MG/1
800 TABLET, FILM COATED ORAL EVERY 6 HOURS
Status: DISCONTINUED | OUTPATIENT
Start: 2022-05-20 | End: 2022-05-21 | Stop reason: HOSPADM

## 2022-05-20 RX ADMIN — ACETAMINOPHEN 975 MG: 325 TABLET ORAL at 17:14

## 2022-05-20 RX ADMIN — KETOROLAC TROMETHAMINE 30 MG: 30 INJECTION, SOLUTION INTRAMUSCULAR; INTRAVENOUS at 03:56

## 2022-05-20 RX ADMIN — IBUPROFEN 800 MG: 400 TABLET, FILM COATED ORAL at 23:23

## 2022-05-20 RX ADMIN — SENNOSIDES AND DOCUSATE SODIUM 1 TABLET: 50; 8.6 TABLET ORAL at 19:51

## 2022-05-20 RX ADMIN — ACETAMINOPHEN 975 MG: 325 TABLET ORAL at 23:23

## 2022-05-20 RX ADMIN — SENNOSIDES AND DOCUSATE SODIUM 1 TABLET: 50; 8.6 TABLET ORAL at 08:36

## 2022-05-20 RX ADMIN — ACETAMINOPHEN 975 MG: 325 TABLET ORAL at 03:56

## 2022-05-20 RX ADMIN — IBUPROFEN 800 MG: 400 TABLET, FILM COATED ORAL at 17:14

## 2022-05-20 RX ADMIN — KETOROLAC TROMETHAMINE 30 MG: 30 INJECTION, SOLUTION INTRAMUSCULAR; INTRAVENOUS at 10:41

## 2022-05-20 RX ADMIN — ACETAMINOPHEN 975 MG: 325 TABLET ORAL at 10:38

## 2022-05-20 ASSESSMENT — ACTIVITIES OF DAILY LIVING (ADL)
ADLS_ACUITY_SCORE: 21
ADLS_ACUITY_SCORE: 22
ADLS_ACUITY_SCORE: 18

## 2022-05-20 NOTE — PLAN OF CARE
Pt have been attempted to void a few times but unable to. Bladder scan showed 999 ml. Straight cathed with 1400 ml. Continues to monitor Pt.

## 2022-05-20 NOTE — PLAN OF CARE
Vital signs stable. Postpartum assessment WDL. Incision clean, dry, and intact. Pain controlled with tylenol and toradol. Patient ambulating independently in room. Patient passing gas. Breastfeeding is going ok. Patient and infant bonding well. Will continue with current plan of care.

## 2022-05-20 NOTE — PLAN OF CARE
Updated to Dr. Kelly that Pt couldn't void since this morning and got straight cath twice since this afternoon for 1400 ml and 1600 ml. Pt just tried to void again and still couldn't. Bladder scanner showed 648 ml. Order received: keep umñoz in over night per Dr. Kelly. Continues to monitor Pt.

## 2022-05-20 NOTE — PLAN OF CARE
Vital signs stable. Postpartum assessment WDL. Incision WDL, drainage marked. Pain controlled with tylenol and toradol. Patient ambulating with assistance, plan to remove muñoz this AM. Patient denies passing gas. Breastfeeding on cue with staff assist. Patient and infant bonding well. Will continue with current plan of care.

## 2022-05-20 NOTE — ANESTHESIA CARE TRANSFER NOTE
Patient: Nabila Zapata    Procedure: Procedure(s):   SECTION       Diagnosis: Single delivery by  section [O82]  Diagnosis Additional Information: No value filed.    Anesthesia Type:   No value filed.     Note:    Oropharynx: oropharynx clear of all foreign objects  Level of Consciousness: awake  Oxygen Supplementation: room air    Independent Airway: airway patency satisfactory and stable  Dentition: dentition unchanged  Vital Signs Stable: post-procedure vital signs reviewed and stable  Report to RN Given: handoff report given  Patient transferred to: Labor and Delivery    Handoff Report: Identifed the Patient, Identified the Reponsible Provider, Reviewed the pertinent medical history, Discussed the surgical course, Reviewed Intra-OP anesthesia mangement and issues during anesthesia, Set expectations for post-procedure period and Allowed opportunity for questions and acknowledgement of understanding      Vitals:  Vitals Value Taken Time   BP     Temp     Pulse     Resp     SpO2         Electronically Signed By: KYLE Smallwood CRNA  May 19, 2022  8:13 PM

## 2022-05-20 NOTE — LACTATION NOTE
Initial visit with Nabila, FOB  And baby girl  Rooting at time of visit and LC assisted mother with positioning and obtaining a deep latch on the left breast.    Breastfeeding general information reviewed.   Advised to breastfeed exclusively, on demand, avoid pacifiers, bottles and formula unless medically indicated.  Encouraged rooming in, skin to skin, feeding on demand 8-12x/day or sooner if baby cues.  Explained benefits of holding and skin to skin.  Encouraged lots of skin to skin. Instructed on hand expression.  gtts obtained from left breast and nutritive suckling pattern noted.    Continues to nurse well per mom. No further questions at this time.   Will follow as needed.   Shelby Owusu BSN, RN, PHN, RNC-MNN, IBCLC

## 2022-05-20 NOTE — OP NOTE
Primary LTCS    Arrest of descent / dilatation in active labor   (occiput posterior)    Epidural    Female @ 1930  3360 grams  apgars 8-9    Placenta spont / intact / 3vc     ml    No intraoperative complications    To RR stable    Rh positive    Dictated    Francisco BROWER

## 2022-05-20 NOTE — ANESTHESIA PREPROCEDURE EVALUATION
Anesthesia Pre-Procedure Evaluation    Patient: Nabila Zapata   MRN: [id number] : 1985        Procedure : Procedure(s):   SECTION          Past Medical History:   Diagnosis Date     Abnormal Pap smear of cervix 2009     Endocrine disease      Fibroid      LSIL (low grade squamous intraepithelial lesion) on Pap smear 2010    STEPHANIE I on colp 2010     SIADH (syndrome of inappropriate ADH production) (H) 2011    hospitalized. will see Endocrinologist     Varicella       Past Surgical History:   Procedure Laterality Date     DILATION AND CURETTAGE, HYSTEROSCOPY DIAGNOSTIC, COMBINED N/A 2016    Procedure: COMBINED DILATION AND CURETTAGE, HYSTEROSCOPY DIAGNOSTIC;  Surgeon: Aj Clemons MD;  Location: [facility] OR     SURGICAL HISTORY OF -   4/10    colposcopy; mild dysplasia; no treatment.     SURGICAL HISTORY OF     colposcopy; recommended repeat      No Known Allergies   Social History     Tobacco Use     Smoking status: Former Smoker     Packs/day: 1.00     Years: 4.00     Pack years: 4.00     Smokeless tobacco: Never Used   Substance Use Topics     Alcohol use: Not Currently     Alcohol/week: 4.2 standard drinks     Types: 5 Standard drinks or equivalent per week     Comment: 1 glass of wine daily      Wt Readings from Last 1 Encounters:   22 76.2 kg (168 lb)        Anesthesia Evaluation   Pt has had prior anesthetic.     No history of anesthetic complications       ROS/MED HX  ENT/Pulmonary:    (-) sleep apnea   Neurologic:    (-) no CVA   Cardiovascular:    (-) CAD   METS/Exercise Tolerance:     Hematologic:       Musculoskeletal:       GI/Hepatic:    (-) GERD   Renal/Genitourinary: Comment: Failure to progress      Endo:    (-) Type II DM   Psychiatric/Substance Use:       Infectious Disease:       Malignancy:       Other:               OUTSIDE LABS:  CBC:   Lab Results   Component Value Date    WBC 22.2 (H) 2022    WBC 10.9 02/10/2022    HGB 12.1 2022    HGB  11.2 (L) 02/10/2022    HCT 36.2 05/19/2022    HCT 34.0 (L) 02/10/2022     05/19/2022     02/10/2022     BMP:   Lab Results   Component Value Date     05/14/2020     08/31/2012    POTASSIUM 3.8 05/14/2020    POTASSIUM 4.2 10/24/2011    CHLORIDE 106 05/14/2020    CHLORIDE 106 10/24/2011    CO2 25 05/14/2020    CO2 24 10/24/2011    BUN 9 05/14/2020    BUN 12 10/24/2011    CR 0.62 05/14/2020    CR 0.65 10/24/2011    GLC 85 05/14/2020    GLC 82 10/24/2011     COAGS: No results found for: PTT, INR, FIBR  POC:   Lab Results   Component Value Date    HCG Negative 02/12/2016     HEPATIC:   Lab Results   Component Value Date    ALBUMIN 4.0 05/14/2020    PROTTOTAL 7.6 05/14/2020    ALT 16 05/14/2020    AST 15 05/14/2020    ALKPHOS 36 (L) 05/14/2020    BILITOTAL 0.5 05/14/2020     OTHER:   Lab Results   Component Value Date    NGA 8.5 05/14/2020    TSH 1.31 05/14/2020    T4 1.10 08/31/2012    T3 120 10/24/2011    CRP 5.9 10/24/2011    SED 5 10/27/2011       Anesthesia Plan    ASA Status:  2, emergent    NPO Status:  NPO Appropriate    Anesthesia Type: Epidural.              Consents    Anesthesia Plan(s) and associated risks, benefits, and realistic alternatives discussed. Questions answered and patient/representative(s) expressed understanding.    - Discussed:     - Discussed with:  Patient         Postoperative Care    Pain management: Multi-modal analgesia.   PONV prophylaxis: Ondansetron (or other 5HT-3)     Comments:                Lucy Madrigal MD, MD

## 2022-05-20 NOTE — PLAN OF CARE
Pt unable to void again. Bladder scanned for 999 ml. Straight cathed for 1600 ml. Continues to monitor pt.

## 2022-05-20 NOTE — PROGRESS NOTES
[facility]  Obstetrics Post-Op / Progress Note         Assessment and Plan:    Assessment:   Post-operative day #1  Low transverse primary  section  L&D complications: Single delivery by  section [O82]      Doing well.  No immediate surgical complications identified.  No excessive bleeding  Pain well-controlled.      Plan:   Ambulation encouraged  Breast feeding strategies discussed  Monitor wound for signs of infection  Pain control measures as needed  Anticipate discharge in 1-2 days            Interval History:   Doing well.  Pain is well-controlled.  No fevers.  No history of wound drainage, warmth or significant erythema.  Good appetite.  Denies chest pain, shortness of breath, nausea or vomiting.  Ambulatory.  Breastfeeding well.          Significant Problems:    (Refer to attending physician notes regarding additional medical problems and issues)          Review of Systems:    The patient denies any chest pain, shortness of breath, excessive pain, fever, chills, purulent drainage from the wound, nausea or vomiting.          Medications:     All medications related to the patient's surgery have been reviewed  Current Facility-Administered Medications   Medication     acetaminophen (TYLENOL) tablet 975 mg     [START ON 2022] bisacodyl (DULCOLAX) Suppository 10 mg     carboprost (HEMABATE) injection 250 mcg     dextrose 5% in lactated ringers infusion     hydrocortisone (Perianal) (ANUSOL-HC) 2.5 % cream     ketorolac (TORADOL) injection 30 mg    Or     ketorolac (TORADOL) injection 30 mg    Or     ibuprofen (ADVIL/MOTRIN) tablet 800 mg     ibuprofen (ADVIL/MOTRIN) tablet 800 mg     ketorolac (TORADOL) injection 30 mg     lanolin cream     lidocaine (LMX4) cream     lidocaine 1 % 0.1-1 mL     lidocaine 1 % 0.1-20 mL     Measles, Mumps & Rubella Vac (MMR) injection 0.5 mL     methylergonovine (METHERGINE) injection 200 mcg     metoclopramide (REGLAN) injection 10 mg     Or     metoclopramide (REGLAN) tablet 10 mg     misoprostol (CYTOTEC) tablet 400 mcg    Or     misoprostol (CYTOTEC) tablet 800 mcg     nalbuphine (NUBAIN) injection 2.5-5 mg     nalbuphine (NUBAIN) injection 2.5-5 mg     naloxone (NARCAN) injection 0.2 mg    Or     naloxone (NARCAN) injection 0.4 mg    Or     naloxone (NARCAN) injection 0.2 mg    Or     naloxone (NARCAN) injection 0.4 mg     ondansetron (ZOFRAN ODT) ODT tab 4 mg    Or     ondansetron (ZOFRAN) injection 4 mg     oxyCODONE (ROXICODONE) tablet 5 mg     oxytocin (PITOCIN) 30 units in 500 mL 0.9% NaCl infusion     oxytocin (PITOCIN) 30 units in 500 mL 0.9% NaCl infusion     oxytocin (PITOCIN) injection 10 Units     oxytocin (PITOCIN) injection 10 Units     prochlorperazine (COMPAZINE) injection 10 mg    Or     prochlorperazine (COMPAZINE) tablet 10 mg    Or     prochlorperazine (COMPAZINE) suppository 25 mg     senna-docusate (SENOKOT-S/PERICOLACE) 8.6-50 MG per tablet 1 tablet    Or     senna-docusate (SENOKOT-S/PERICOLACE) 8.6-50 MG per tablet 2 tablet     simethicone (MYLICON) chewable tablet 80 mg     sodium chloride (PF) 0.9% PF flush 3 mL     sodium chloride (PF) 0.9% PF flush 3 mL     [START ON 5/21/2022] sodium phosphate (FLEET ENEMA) 1 enema     Tdap (tetanus-diphtheria-acell pertussis) (ADACEL) injection 0.5 mL     tranexamic acid 1 g in 100 mL NS IV bag (premix)             Physical Exam:     All vitals stable  Patient Vitals for the past 12 hrs:   BP Temp Temp src Pulse Resp SpO2   05/20/22 0644 118/76 97.9  F (36.6  C) Oral 87 16 100 %   05/20/22 0217 126/77 98.7  F (37.1  C) Oral 95 16 99 %   05/20/22 0056 117/70 98.5  F (36.9  C) Oral 94 16 100 %   05/20/22 0005 113/77 99  F (37.2  C) Oral 97 16 99 %   05/19/22 2258 122/63 99.1  F (37.3  C) Oral 100 16 98 %     Wound clean and dry with minimal or no drainage.  Surrounding skin with minimal erythema.          Data:     All laboratory data related to this surgery reviewed  Hemoglobin    Date Value Ref Range Status   05/20/2022 9.6 (L) 11.7 - 15.7 g/dL Final   05/19/2022 12.1 11.7 - 15.7 g/dL Final   02/10/2022 11.2 (L) 11.7 - 15.7 g/dL Final   10/06/2021 13.2 11.7 - 15.7 g/dL Final   05/14/2020 13.5 11.7 - 15.7 g/dL Final   02/12/2016 11.5 (L) 11.7 - 15.7 g/dL Final   10/24/2011 13.2 11.7 - 15.7 g/dL Final   09/15/2011 13.3 11.7 - 15.7 g/dL Final   09/11/2011 12.1 11.7 - 15.7 g/dL Final     No imaging studies have been ordered    Yara Perez MD, MD

## 2022-05-20 NOTE — ANESTHESIA POSTPROCEDURE EVALUATION
Patient: Nabila Zapata    Procedure: Procedure(s):   SECTION       Anesthesia Type:  No value filed.    Note:     Postop Pain Control: Uneventful            Sign Out: Well controlled pain   PONV: No   Neuro/Psych: Uneventful            Sign Out: Acceptable/Baseline neuro status   Airway/Respiratory: Uneventful            Sign Out: Acceptable/Baseline resp. status   CV/Hemodynamics: Uneventful            Sign Out: Acceptable CV status; No obvious hypovolemia; No obvious fluid overload   Other NRE: NONE   DID A NON-ROUTINE EVENT OCCUR? No           Last vitals:  Vitals Value Taken Time   BP 96/64 22   Temp 36.7  C (98  F) 22   Pulse 112 22   Resp 19 22   SpO2 97 % 22   Vitals shown include unvalidated device data.    Electronically Signed By: Lucy Madrigal MD, MD  May 19, 2022  9:06 PM

## 2022-05-21 VITALS
OXYGEN SATURATION: 100 % | DIASTOLIC BLOOD PRESSURE: 78 MMHG | TEMPERATURE: 98.1 F | HEART RATE: 68 BPM | WEIGHT: 168 LBS | RESPIRATION RATE: 16 BRPM | HEIGHT: 68 IN | BODY MASS INDEX: 25.46 KG/M2 | SYSTOLIC BLOOD PRESSURE: 120 MMHG

## 2022-05-21 PROCEDURE — 250N000013 HC RX MED GY IP 250 OP 250 PS 637: Performed by: OBSTETRICS & GYNECOLOGY

## 2022-05-21 RX ORDER — ACETAMINOPHEN 325 MG/1
975 TABLET ORAL EVERY 6 HOURS
Qty: 90 TABLET | Refills: 0 | Status: SHIPPED | OUTPATIENT
Start: 2022-05-21 | End: 2022-12-13

## 2022-05-21 RX ORDER — IBUPROFEN 800 MG/1
800 TABLET, FILM COATED ORAL EVERY 6 HOURS
Qty: 90 TABLET | Refills: 0 | Status: SHIPPED | OUTPATIENT
Start: 2022-05-21 | End: 2022-12-13

## 2022-05-21 RX ORDER — OXYCODONE HYDROCHLORIDE 5 MG/1
5 TABLET ORAL EVERY 4 HOURS PRN
Qty: 20 TABLET | Refills: 0 | Status: SHIPPED | OUTPATIENT
Start: 2022-05-21 | End: 2022-06-02

## 2022-05-21 RX ORDER — MODIFIED LANOLIN
OINTMENT (GRAM) TOPICAL
Qty: 7 G | Refills: 0 | Status: SHIPPED | OUTPATIENT
Start: 2022-05-21 | End: 2023-07-25

## 2022-05-21 RX ADMIN — ACETAMINOPHEN 975 MG: 325 TABLET ORAL at 11:40

## 2022-05-21 RX ADMIN — ACETAMINOPHEN 975 MG: 325 TABLET ORAL at 05:42

## 2022-05-21 RX ADMIN — OXYCODONE HYDROCHLORIDE 5 MG: 5 TABLET ORAL at 11:40

## 2022-05-21 RX ADMIN — IBUPROFEN 800 MG: 400 TABLET, FILM COATED ORAL at 11:40

## 2022-05-21 RX ADMIN — IBUPROFEN 800 MG: 400 TABLET, FILM COATED ORAL at 05:43

## 2022-05-21 RX ADMIN — SENNOSIDES AND DOCUSATE SODIUM 2 TABLET: 50; 8.6 TABLET ORAL at 08:43

## 2022-05-21 ASSESSMENT — ACTIVITIES OF DAILY LIVING (ADL)
ADLS_ACUITY_SCORE: 18

## 2022-05-21 NOTE — PLAN OF CARE
D: VSS, assessments WDL.   I: Pt. received complete discharge paperwork and home medications as filled by discharge pharmacy.  Pt. was given times of last dose for all discharge medications in writing on discharge medication sheets.  Discharge teaching included home medication, pain management, activity restrictions, postpartum cares, and signs and symptoms of infection.    A: Discharge outcomes on care plan met.  Mother states understanding and comfort with self care and follow up care.   P: Pt. Discharged.  Pt. was accompanied by , nurse, and left with personal belongings.  Pt. to follow up with OB provider per discharge instructions.  Pt. had no further questions at the time of discharge and no unmet needs were identified.

## 2022-05-21 NOTE — PROGRESS NOTES
[facility]  Obstetrics Post-Op / Progress Note         Assessment and Plan:    Assessment:   Post-operative day #2  Low transverse primary  section  L&D complications: Single delivery by  section [O82]      Doing well.  Clean wound without signs of infection.  Pain well-controlled.  Shaikh catheter replaced late yesterday after patient straight cathed x2. Shaikh removed earlier this am.   Patient is motivated to go home today.      Plan:   Ambulation encouraged  Breast feeding strategies discussed  Monitor wound for signs of infection  Pain control measures as needed  Reportable signs and symptoms dicussed with the patient  Patient advised to attempt voiding every 2 hours to decrease risk of overextension of bladder.     Discharge later today as long as patient is able to void.            Interval History:   Doing well.  Pain is well-controlled.  No fevers.  No history of wound drainage, warmth or significant erythema.  Good appetite.  Denies chest pain, shortness of breath, nausea or vomiting.  Ambulatory.  Breastfeeding well.          Significant Problems:      Past Medical History:   Diagnosis Date     Abnormal Pap smear of cervix 2009     Endocrine disease      Fibroid      LSIL (low grade squamous intraepithelial lesion) on Pap smear 2010    STEPHANIE I on colp 2010     SIADH (syndrome of inappropriate ADH production) (H) 2011    hospitalized. will see Endocrinologist     Varicella              Review of Systems:    The patient denies any chest pain, shortness of breath, excessive pain, fever, chills, purulent drainage from the wound, nausea or vomiting.          Medications:     All medications related to the patient's surgery have been reviewed  Current Facility-Administered Medications   Medication     acetaminophen (TYLENOL) tablet 975 mg     bisacodyl (DULCOLAX) Suppository 10 mg     carboprost (HEMABATE) injection 250 mcg     dextrose 5% in lactated ringers  infusion     hydrocortisone (Perianal) (ANUSOL-HC) 2.5 % cream     ibuprofen (ADVIL/MOTRIN) tablet 800 mg     lanolin cream     lidocaine (LMX4) cream     lidocaine 1 % 0.1-1 mL     Measles, Mumps & Rubella Vac (MMR) injection 0.5 mL     methylergonovine (METHERGINE) injection 200 mcg     metoclopramide (REGLAN) injection 10 mg    Or     metoclopramide (REGLAN) tablet 10 mg     misoprostol (CYTOTEC) tablet 400 mcg    Or     misoprostol (CYTOTEC) tablet 800 mcg     nalbuphine (NUBAIN) injection 2.5-5 mg     nalbuphine (NUBAIN) injection 2.5-5 mg     naloxone (NARCAN) injection 0.2 mg    Or     naloxone (NARCAN) injection 0.4 mg    Or     naloxone (NARCAN) injection 0.2 mg    Or     naloxone (NARCAN) injection 0.4 mg     ondansetron (ZOFRAN ODT) ODT tab 4 mg    Or     ondansetron (ZOFRAN) injection 4 mg     oxyCODONE (ROXICODONE) tablet 5 mg     oxytocin (PITOCIN) 30 units in 500 mL 0.9% NaCl infusion     oxytocin (PITOCIN) injection 10 Units     prochlorperazine (COMPAZINE) injection 10 mg    Or     prochlorperazine (COMPAZINE) tablet 10 mg    Or     prochlorperazine (COMPAZINE) suppository 25 mg     senna-docusate (SENOKOT-S/PERICOLACE) 8.6-50 MG per tablet 1 tablet    Or     senna-docusate (SENOKOT-S/PERICOLACE) 8.6-50 MG per tablet 2 tablet     simethicone (MYLICON) chewable tablet 80 mg     sodium chloride (PF) 0.9% PF flush 3 mL     sodium chloride (PF) 0.9% PF flush 3 mL     sodium phosphate (FLEET ENEMA) 1 enema     Tdap (tetanus-diphtheria-acell pertussis) (ADACEL) injection 0.5 mL     tranexamic acid 1 g in 100 mL NS IV bag (premix)             Physical Exam:     All vitals stable  Patient Vitals for the past 12 hrs:   BP Temp Temp src Pulse Resp   05/20/22 2323 115/67 97.8  F (36.6  C) Oral 79 16     Wound clean and dry with minimal or no drainage.  Surrounding skin with minimal erythema.          Data:     All laboratory data related to this surgery reviewed  Hemoglobin   Date Value Ref Range Status    05/20/2022 9.6 (L) 11.7 - 15.7 g/dL Final   05/19/2022 12.1 11.7 - 15.7 g/dL Final   02/10/2022 11.2 (L) 11.7 - 15.7 g/dL Final   10/06/2021 13.2 11.7 - 15.7 g/dL Final   05/14/2020 13.5 11.7 - 15.7 g/dL Final   02/12/2016 11.5 (L) 11.7 - 15.7 g/dL Final   10/24/2011 13.2 11.7 - 15.7 g/dL Final   09/15/2011 13.3 11.7 - 15.7 g/dL Final   09/11/2011 12.1 11.7 - 15.7 g/dL Final     No imaging studies have been ordered    Yara Perez MD, MD

## 2022-05-21 NOTE — PLAN OF CARE
Vital signs stable.Fundus firm midline. Incision well approximated, adhesive strips intact. Shaikh removed due to void.Lung sounds clear and equal. Using Tylenol and ibuprofen for pain management. Up ambulating without dizziness. Breastfeeding every 2-3 hours. Encouraged to call with questions/concerns. Will continue to monitor.

## 2022-05-21 NOTE — PLAN OF CARE
Vital signs stable. Postpartum assessment WDL. Incision clean, dry, and intact. Pain controlled with oxycodone, tylenol, and ibuprofen. Patient ambulating independently in room. Patient passing gas. Breastfeeding is going well. Patient and infant bonding well. Patient is looking forward to discharge to home later this evening. Will continue with current plan of care.

## 2022-05-26 NOTE — OP NOTE
Procedure Date: 2022    PREOPERATIVE DIAGNOSIS:  A 40-week 1 day intrauterine gestation, protracted progress in active labor.    POSTOPERATIVE DIAGNOSIS:   A 40-week 1 day intrauterine gestation, protracted progress in active labor, along with pregnancy delivered.    PROCEDURE PERFORMED:  Primary low transverse  section.    ANESTHESIA:  Epidural.    QUANTITATIVE BLOOD LOSS:  890 mL.    INFANT STATISTICS:  Female, 7:30 p.m., 2022, 3360 grams, Apgars 8 and 9 at 1 and 5 minutes respectively.  There were no intraoperative complications.    DESCRIPTION OF PROCEDURE:  Briefly, our procedure is as follows.  The patient is a 36-year-old nullipara who presented to [facility] on the morning hours of 2022 at 40 weeks and 1 day gestation, complaining of rupture of membranes at approximately 1:15 in the morning.  Her labor was augmented.  She received an epidural by midday.      My coverage for the patient began after 5:30 p.m.  At approximately 6:00 p.m. I examined her.  Cervix was 6-7 cm dilated and the fetal vertex was in the -1 to -2 station in the occiput posterior position.  She had been 7 cm dilated as early as 1:00 p.m.  Diagnosis of protracted progress in active labor was made.  She was on a small amount of intravenous oxytocin at that time. I recommended we place an intrauterine pressure catheter to maximize efficiency of the labor management and oxytocin administration, subsequently hoping for descent and rotation of the fetal vertex.  The patient and her , after extensive counseling, declined to proceed with further labor and requested a  section delivery.  Her oxytocin was discontinued.  She was consented appropriately and epidural was dosed.      She was brought to the operating room.  Shaikh catheter was already in her bladder.  She was prepped and draped in standard fashion.  A low transverse incision was made over the lower portion of  her abdomen with use of a scalpel, which then incised the subcutaneous tissues and the fascia bilaterally.  A modified Pfannenstiel incision was created as the rectus muscles were  off the overlying fascia superiorly and inferiorly.  The rectus muscles were  in midline. The peritoneal cavity was entered sharply.  The peritoneal incision was extended appropriately.  The bladder was sharply dissected off the lower uterus.  A low transverse hysterotomy was made with use of a scalpel.  It was extended bilaterally with blunt dissection.  Baby delivered from a vertex presentation in the occiput posterior position.  As its mouth and nares appeared, they were bulb suctioned.  The rest of the infant was delivered.  One minute later, the cord was doubly clamped and cut and the  was handed to the nursing team in attendance.  Once again, a female, 7:30 p.m., 2022, 3360 grams, Apgars 8 and 9 at 1 and 5 minutes respectively.  The placenta delivered spontaneously intact, was found to have a 3-vessel cord.  Uterus was exteriorized for closure.  The internal portion of the uterus was curetted with a moist sponge to remove any remaining clots and debris and after several minutes, the uterus was firming adequately with manual massage and intravenous Pitocin.  Closure was achieved at the hysterotomy site with a running locking layer of 0 Vicryl with a second layer of interrupted sutures of 0 Vicryl to reinforce the first.  The uterus was replaced for closure.  The gutters were copiously irrigated.  Hemostasis was assured one final time at the hysterotomy site and also at the bladder edge.  We then achieved closure in the parietal peritoneum with a running suture of 2-0 Vicryl, closure of the fascia with a running suture of 0 PDS, closure in a subcutaneous space with interrupted sutures of 3-0 plain and a subcuticular closure of 4-0 Vicryl was placed.  Steri-Strips were applied.  Sponge and needle count  were correct at the end of the case x3.    Primary low transverse  section; arrest of descent and dilatation in the active phase of labor, occiput posterior.    Valentin Singh MD        D: 2022   T: 2022   MT: [id number]    Name:     INDY NASH  MRN:      [id number]        Account:        [id number]   :      1985           Procedure Date: 2022     Document: [id number]

## 2022-05-27 NOTE — DISCHARGE SUMMARY
[facility]    Discharge Summary  Obstetrics    Date of Admission:  2022  Date of Discharge:  2022  4:24 PM  Discharging Provider: Yraa Perez MD, MD  Date of Service (when I saw the patient): 2022    Discharge Diagnoses   Failure to progress  Intrauterine pregnancy at 40 weeks gestation  Primary  section    Procedure/Surgery Information   Procedure: Procedure(s):   SECTION   Surgeon(s): Surgeon(s) and Role:     * Valentin Singh MD - Primary     History of Present Illness   Nabila Zapata is a 36 year old female who presented with SROM at term gestation. Labor was augmented.     Hospital Course   The patient's hospital course was unremarkable.  She recovered as anticipated and experienced no post-operative complications.  On discharge, her pain was well controlled. Vaginal bleeding is similar to peak menstrual flow.  Voiding without difficulty.  Ambulating well and tolerating a normal diet.  No fever or significant wound drainage.  Breastfeeding well.  Infant is stable.  She was discharged on post-partum day #2.    Post-partum hemoglobin:   Hemoglobin   Date Value Ref Range Status   2022 9.6 (L) 11.7 - 15.7 g/dL Final   2020 13.5 11.7 - 15.7 g/dL Final       Yara Perez MD, MD    Discharge Disposition   Discharged to home   Condition at discharge: Good    Pending Results   Final pathology results: No pathology submitted  These results will be followed up by   Unresulted Labs Ordered in the Past 30 Days of this Admission     No orders found from 2022 to 2022.          Primary Care Physician   Physician No Ref-Primary    Consultations This Hospital Stay   ANESTHESIOLOGY IP CONSULT  CARE MANAGEMENT / SOCIAL WORK IP CONSULT  LACTATION IP CONSULT  CARE MANAGEMENT / SOCIAL WORK IP CONSULT    Discharge Orders      Postpartum Home Care Referral      Activity    Activity as tolerated     Reason for your hospital stay    Maternity care      Follow Up    Follow up with provider in 2 weeks and 6 weeks for post-delivery checks     Activity    No driving while on narcotics     Breast Pump DME    Breast Pump Documentation:   Manual/Electric Pump: To support adequate breast milk production and nutrition for infant.     I, the undersigned, certify that the above prescribed supplies are medically necessary for this patient and is both reasonable and necessary in reference to accepted standards of medical and necessary in reference to accepted standards of medical practice in the treatment of this patient's condition and is not prescribed as a convenience.     Diet    Resume previous diet     Discharge Medications   Discharge Medication List as of 5/21/2022  1:28 PM      START taking these medications    Details   acetaminophen (TYLENOL) 325 MG tablet Take 3 tablets (975 mg) by mouth every 6 hours, Disp-90 tablet, R-0, Local Print      ibuprofen (ADVIL/MOTRIN) 800 MG tablet Take 1 tablet (800 mg) by mouth every 6 hours, Disp-90 tablet, R-0, Local Print      lanolin ointment Apply topically every hour as needed for dry skin (soreness)Disp-7 g, R-0Local Print      oxyCODONE (ROXICODONE) 5 MG tablet Take 1 tablet (5 mg) by mouth every 4 hours as needed for breakthrough pain, Disp-20 tablet, R-0, Local Print         CONTINUE these medications which have NOT CHANGED    Details   cetirizine (ZYRTEC) 10 MG tablet Take 10 mg by mouth daily as needed for allergies, Historical      Prenatal Vit-Fe Fumarate-FA (PRENATAL MULTIVITAMIN W/IRON) 27-0.8 MG tablet Take 1 tablet by mouth daily, Disp-90 tablet, R-3, No Print Out           Allergies   No Known Allergies

## 2022-06-02 ENCOUNTER — TELEPHONE (OUTPATIENT)
Dept: OBGYN | Facility: CLINIC | Age: 37
End: 2022-06-02

## 2022-06-02 ENCOUNTER — OFFICE VISIT (OUTPATIENT)
Dept: OBGYN | Facility: CLINIC | Age: 37
End: 2022-06-02
Payer: COMMERCIAL

## 2022-06-02 VITALS — SYSTOLIC BLOOD PRESSURE: 110 MMHG | DIASTOLIC BLOOD PRESSURE: 68 MMHG | WEIGHT: 142 LBS | BODY MASS INDEX: 21.59 KG/M2

## 2022-06-02 DIAGNOSIS — Z98.890 POSTOPERATIVE STATE: Primary | ICD-10-CM

## 2022-06-02 PROCEDURE — 99212 OFFICE O/P EST SF 10 MIN: CPT | Performed by: OBSTETRICS & GYNECOLOGY

## 2022-06-02 NOTE — NURSING NOTE
"Chief Complaint   Patient presents with     Surgical Followup       Initial LMP 2021  Estimated body mass index is 25.54 kg/m  as calculated from the following:    Height as of 22: 1.727 m (5' 8\").    Weight as of 22: 76.2 kg (168 lb).  BP completed using cuff size: regular    Questioned patient about current smoking habits.  Pt. quit smoking some time ago.          The following HM Due: NONE      The following patient reported/Care Every where data was sent to:  P ABSTRACT QUALITY INITIATIVES [[id number]]        [facility]                 "

## 2022-06-02 NOTE — PATIENT INSTRUCTIONS
You can reach your [facility] Care Team any time of the day by calling [phone]. This number will put you in touch with the 24 hour nurse line if the clinic is closed.    To contact your OB/GYN Station Coordinator/Surgery Scheduler please call [phone]. This is a direct number for your care team between 8 a.m. and 4 p.m. Monday through Friday.    [facility] is open for your convenience:  Monday through Friday 8 a.m. to 6 p.m.  Closed weekends and all major holidays.

## 2022-06-02 NOTE — PROGRESS NOTES
Nabila Zapata is a 36 year old female  1 2022 underwent a primary low segment transverse  section at [facility] for intrauterine pregnancy at 40 weeks and 1 day gestation with protracted progress in the active phase of labor.  The patient presents today for postop follow-up visit.  She is doing well.  Her pain is in good control requiring only ibuprofen.  She has normal bowel bladder function.  The patient is breast-feeding without concern    Past Medical History:   Diagnosis Date     Abnormal Pap smear of cervix 2009     Endocrine disease      Fibroid      LSIL (low grade squamous intraepithelial lesion) on Pap smear ,     STEPHANIE I on colp 2010     SIADH (syndrome of inappropriate ADH production) (H) 2011    hospitalized. will see Endocrinologist     Varicella      Current Outpatient Medications   Medication     acetaminophen (TYLENOL) 325 MG tablet     cetirizine (ZYRTEC) 10 MG tablet     ibuprofen (ADVIL/MOTRIN) 800 MG tablet     lanolin ointment     Prenatal Vit-Fe Fumarate-FA (PRENATAL MULTIVITAMIN W/IRON) 27-0.8 MG tablet     No current facility-administered medications for this visit.     /68   Wt 64.4 kg (142 lb)   LMP 2021   BMI 21.59 kg/m    Constitutional: healthy, alert and no distress  Gastrointestinal: Abdomen soft, minimally-tender. BS normal. No masses, organomegaly the Pfannenstiel skin incision is clean intact and healing well    (Z98.890) Postoperative state  (primary encounter diagnosis)  Comment: Doing well.  We again reviewed postop restrictions.  The patient is taking prenatal vitamins.  Plan: I will see her back in 1 months for her final 6-week visit.  Patient will call for any concerns in the interval

## 2022-06-02 NOTE — TELEPHONE ENCOUNTER
Forms in process. Place in Dr. Byers's inbox in his office in  for review, completion and signature.

## 2022-06-02 NOTE — TELEPHONE ENCOUNTER
Form received from: [employer] [facility]    Form requesting following info/need: STD    LAKESHA needed?: No    Location of form: Saima's desk    When completed the route for return: Fax [phone]

## 2022-06-06 ENCOUNTER — TELEPHONE (OUTPATIENT)
Dept: OBGYN | Facility: CLINIC | Age: 37
End: 2022-06-06
Payer: COMMERCIAL

## 2022-06-06 NOTE — TELEPHONE ENCOUNTER
Form received from: [employer] [facility]    Form requesting following info/need: STD    LAKESHA needed?: No    Location of form: Saima's desk    When completed the route for return: Fax [phone]

## 2022-06-30 ENCOUNTER — PRENATAL OFFICE VISIT (OUTPATIENT)
Dept: OBGYN | Facility: CLINIC | Age: 37
End: 2022-06-30
Payer: COMMERCIAL

## 2022-06-30 VITALS
WEIGHT: 137.6 LBS | DIASTOLIC BLOOD PRESSURE: 72 MMHG | HEART RATE: 79 BPM | SYSTOLIC BLOOD PRESSURE: 110 MMHG | BODY MASS INDEX: 20.92 KG/M2

## 2022-06-30 DIAGNOSIS — Z30.011 ENCOUNTER FOR INITIAL PRESCRIPTION OF CONTRACEPTIVE PILLS: ICD-10-CM

## 2022-06-30 RX ORDER — ACETAMINOPHEN AND CODEINE PHOSPHATE 120; 12 MG/5ML; MG/5ML
0.35 SOLUTION ORAL DAILY
Qty: 84 TABLET | Refills: 3 | Status: SHIPPED | OUTPATIENT
Start: 2022-06-30 | End: 2023-06-09

## 2022-06-30 ASSESSMENT — PATIENT HEALTH QUESTIONNAIRE - PHQ9: SUM OF ALL RESPONSES TO PHQ QUESTIONS 1-9: 1

## 2022-06-30 NOTE — NURSING NOTE
"Chief Complaint   Patient presents with     Postpartum Care     C/S 2022  Girl 7# 6.5 ozs  Pap UTD        Initial /72 (BP Location: Right arm, Cuff Size: Adult Regular)   Pulse 79   Wt 62.4 kg (137 lb 9.6 oz)   LMP 2021   Breastfeeding Yes   BMI 20.92 kg/m   Estimated body mass index is 20.92 kg/m  as calculated from the following:    Height as of 22: 1.727 m (5' 8\").    Weight as of this encounter: 62.4 kg (137 lb 9.6 oz).  BP completed using cuff size: regular    Questioned patient about current smoking habits.  Pt. has never smoked.          The following HM Due: NONE      Jill Cortes CMA on 2022 at 1:27 PM     "

## 2022-06-30 NOTE — PROGRESS NOTES
Postpartum visit    Ms. Nabila Zapata 36 year old returns for her routine postpartum visit. She is doing well.   She is s/p PLTCS on 5/19/22 for protracted progress in active labor.   Today, she reports feeling well.   She has no issues or concerns.   She is currently breast feeding.   Denies vaginal bleeding.   Reports normal bladder and bowel functions.   Desires oral contraceptive pills for birth control.   Denies depression and anxiety, suicidal and homicidal ideation.     /72 (BP Location: Right arm, Cuff Size: Adult Regular)   Pulse 79   Wt 62.4 kg (137 lb 9.6 oz)   LMP 08/11/2021   Breastfeeding Yes   BMI 20.92 kg/m      General Appearance: NAD  Abdomen: Soft, NT, ND  Incision: Well healed. Superficial suture present and removed without difficulty.     A/P: Routine postpartum visit. Doing well.   -- Micronor prescribed for birth control; side effect profile and bleeding precautions and expectations reviewed  -- return for annual pelvic exam or PRN    Modesta Vail MD  [facility]

## 2022-11-25 ENCOUNTER — E-VISIT (OUTPATIENT)
Dept: URGENT CARE | Facility: URGENT CARE | Age: 37
End: 2022-11-25
Payer: COMMERCIAL

## 2022-11-25 ENCOUNTER — OFFICE VISIT (OUTPATIENT)
Dept: URGENT CARE | Facility: URGENT CARE | Age: 37
End: 2022-11-25
Payer: COMMERCIAL

## 2022-11-25 VITALS
OXYGEN SATURATION: 98 % | DIASTOLIC BLOOD PRESSURE: 76 MMHG | RESPIRATION RATE: 18 BRPM | TEMPERATURE: 101.5 F | SYSTOLIC BLOOD PRESSURE: 124 MMHG | HEART RATE: 114 BPM

## 2022-11-25 DIAGNOSIS — R13.10 DYSPHAGIA, UNSPECIFIED TYPE: Primary | ICD-10-CM

## 2022-11-25 DIAGNOSIS — R07.0 THROAT PAIN: Primary | ICD-10-CM

## 2022-11-25 DIAGNOSIS — R50.9 FEVER AND CHILLS: ICD-10-CM

## 2022-11-25 LAB
DEPRECATED S PYO AG THROAT QL EIA: NEGATIVE
FLUAV AG SPEC QL IA: NEGATIVE
FLUBV AG SPEC QL IA: NEGATIVE

## 2022-11-25 PROCEDURE — U0003 INFECTIOUS AGENT DETECTION BY NUCLEIC ACID (DNA OR RNA); SEVERE ACUTE RESPIRATORY SYNDROME CORONAVIRUS 2 (SARS-COV-2) (CORONAVIRUS DISEASE [COVID-19]), AMPLIFIED PROBE TECHNIQUE, MAKING USE OF HIGH THROUGHPUT TECHNOLOGIES AS DESCRIBED BY CMS-2020-01-R: HCPCS | Performed by: NURSE PRACTITIONER

## 2022-11-25 PROCEDURE — 87804 INFLUENZA ASSAY W/OPTIC: CPT | Performed by: NURSE PRACTITIONER

## 2022-11-25 PROCEDURE — 99213 OFFICE O/P EST LOW 20 MIN: CPT | Mod: CS | Performed by: NURSE PRACTITIONER

## 2022-11-25 PROCEDURE — U0005 INFEC AGEN DETEC AMPLI PROBE: HCPCS | Performed by: NURSE PRACTITIONER

## 2022-11-25 PROCEDURE — 87651 STREP A DNA AMP PROBE: CPT | Performed by: NURSE PRACTITIONER

## 2022-11-25 ASSESSMENT — ENCOUNTER SYMPTOMS
CARDIOVASCULAR NEGATIVE: 1
ALLERGIC/IMMUNOLOGIC NEGATIVE: 1
PSYCHIATRIC NEGATIVE: 1
NEUROLOGICAL NEGATIVE: 1
APPETITE CHANGE: 0
HEMATOLOGIC/LYMPHATIC NEGATIVE: 1
MUSCULOSKELETAL NEGATIVE: 1
SORE THROAT: 1
EYES NEGATIVE: 1
GASTROINTESTINAL NEGATIVE: 1
CONSTITUTIONAL NEGATIVE: 1
ENDOCRINE NEGATIVE: 1
COUGH: 1

## 2022-11-25 NOTE — PATIENT INSTRUCTIONS
Dear Nabila Zapata,    We are sorry you are not feeling well. Based on the responses you provided, it is recommended that you be seen in-person in urgent care so we can better evaluate your symptoms. Please click here to find the nearest urgent care location to you.   You will not be charged for this Visit. Thank you for trusting us with your care.    KYLE Man CNP

## 2022-11-26 LAB — GROUP A STREP BY PCR: NOT DETECTED

## 2022-11-26 NOTE — PROGRESS NOTES
"  Assessment & Plan     Throat pain  =  - Streptococcus A Rapid Screen w/Reflex to PCR - Clinic Collect  - Group A Streptococcus PCR Throat Swab    Fever    - Influenza A/B antigen  - Symptomatic; Unknown COVID-19 Virus (Coronavirus) by PCR Nose    - influenza A and B are negative.   - rapid strep test is negative  - Covid test is pending.   - leaning towards viral, but warning signs discussed and when to follow up if worse. Can take OTC medications, stay hydrated, etc. CDC guidelines discussed.              BMI:   Estimated body mass index is 20.92 kg/m  as calculated from the following:    Height as of 5/19/22: 1.727 m (5' 8\").    Weight as of 6/30/22: 62.4 kg (137 lb 9.6 oz).           Please follow up within the week if symptoms do not improve or worsen.     KYLE We[facility]    Roman Dahl is a 36 year old accompanied by herself presenting for the following health issues:  Cough (Fever, ST, cough, body aches X 3 days. )      HPI           Review of Systems   Constitutional: Negative.  Negative for appetite change.   HENT: Positive for sore throat.    Eyes: Negative.    Respiratory: Positive for cough.    Cardiovascular: Negative.    Gastrointestinal: Negative.    Endocrine: Negative.    Genitourinary: Negative.    Musculoskeletal: Negative.    Allergic/Immunologic: Negative.    Neurological: Negative.    Hematological: Negative.    Psychiatric/Behavioral: Negative.             Objective    /76 (BP Location: Right arm, Patient Position: Sitting, Cuff Size: Adult Regular)   Pulse 114   Temp (!) 101.5  F (38.6  C) (Tympanic)   Resp 18   SpO2 98%   There is no height or weight on file to calculate BMI.  Physical Exam  Vitals and nursing note reviewed.   Constitutional:       Appearance: Normal appearance.   HENT:      Right Ear: Tympanic membrane normal.      Left Ear: Tympanic membrane normal.      Nose: Nose normal.      Mouth/Throat:      Lips: " Pink.      Mouth: Mucous membranes are moist.      Pharynx: Oropharynx is clear. Uvula midline. No pharyngeal swelling, oropharyngeal exudate, posterior oropharyngeal erythema or uvula swelling.      Tonsils: No tonsillar exudate or tonsillar abscesses.   Cardiovascular:      Rate and Rhythm: Normal rate.   Pulmonary:      Effort: Pulmonary effort is normal.      Breath sounds: Normal breath sounds.   Abdominal:      Palpations: Abdomen is soft.   Musculoskeletal:      Cervical back: Normal range of motion. No rigidity.      Right lower leg: No edema.      Left lower leg: No edema.   Lymphadenopathy:      Cervical: No cervical adenopathy.   Skin:     General: Skin is warm and dry.      Capillary Refill: Capillary refill takes less than 2 seconds.   Neurological:      General: No focal deficit present.      Mental Status: She is alert and oriented to person, place, and time.   Psychiatric:         Mood and Affect: Mood normal.         Behavior: Behavior normal.         Thought Content: Thought content normal.         Judgment: Judgment normal.          Admission on 05/19/2022, Discharged on 05/21/2022   Component Date Value Ref Range Status     Rupture of Fetal Membranes by ROM * 05/19/2022 Positive (A)  Negative, Invalid, Suggest Repeat Final     Treponema Antibody Total 05/19/2022 Nonreactive  Nonreactive Final     ABO/RH(D) 05/19/2022 A POS   Final     Antibody Screen 05/19/2022 Negative  Negative Final     SPECIMEN EXPIRATION DATE 05/19/2022 [id number]   Final     SARS CoV2 PCR 05/19/2022 Negative  Negative Final    NEGATIVE: SARS-CoV-2 (COVID-19) RNA not detected, presumed negative.     WBC Count 05/19/2022 22.2 (H)  4.0 - 11.0 10e3/uL Final     RBC Count 05/19/2022 3.78 (L)  3.80 - 5.20 10e6/uL Final     Hemoglobin 05/19/2022 12.1  11.7 - 15.7 g/dL Final     Hematocrit 05/19/2022 36.2  35.0 - 47.0 % Final     MCV 05/19/2022 96  78 - 100 fL Final     MCH 05/19/2022 32.0  26.5 - 33.0 pg Final     MCHC  05/19/2022 33.4  31.5 - 36.5 g/dL Final     RDW 05/19/2022 11.9  10.0 - 15.0 % Final     Platelet Count 05/19/2022 159  150 - 450 10e3/uL Final     % Neutrophils 05/19/2022 90  % Final     % Lymphocytes 05/19/2022 5  % Final     % Monocytes 05/19/2022 4  % Final     % Eosinophils 05/19/2022 0  % Final     % Basophils 05/19/2022 0  % Final     % Immature Granulocytes 05/19/2022 1  % Final     NRBCs per 100 WBC 05/19/2022 0  <1 /100 Final     Absolute Neutrophils 05/19/2022 19.9 (H)  1.6 - 8.3 10e3/uL Final     Absolute Lymphocytes 05/19/2022 1.1  0.8 - 5.3 10e3/uL Final     Absolute Monocytes 05/19/2022 0.9  0.0 - 1.3 10e3/uL Final     Absolute Eosinophils 05/19/2022 0.0  0.0 - 0.7 10e3/uL Final     Absolute Basophils 05/19/2022 0.1  0.0 - 0.2 10e3/uL Final     Absolute Immature Granulocytes 05/19/2022 0.2  <=0.4 10e3/uL Final     Absolute NRBCs 05/19/2022 0.0  10e3/uL Final     Hemoglobin 05/20/2022 9.6 (L)  11.7 - 15.7 g/dL Final

## 2022-11-27 ENCOUNTER — TELEPHONE (OUTPATIENT)
Dept: NURSING | Facility: CLINIC | Age: 37
End: 2022-11-27

## 2022-11-27 LAB — SARS-COV-2 RNA RESP QL NAA+PROBE: POSITIVE

## 2022-11-27 NOTE — TELEPHONE ENCOUNTER
Coronavirus (COVID-19) Notification    Caller Name (Patient, parent, daughter/son, grandparent, etc)  patient    Reason for call  Notify of Positive Coronavirus (COVID-19) lab results, assess symptoms,  review [facility] recommendations    Lab Result    Lab test:  2019-nCoV rRt-PCR or SARS-CoV-2 PCR    Oropharyngeal AND/OR nasopharyngeal swabs is POSITIVE for 2019-nCoV RNA/SARS-COV-2 PCR (COVID-19 virus)      Gather patient reported symptoms   Assessment   n n/a   Date of symptom(s) onset (if applicable) n/a     If at time of call, Patients symptoms have worsened, the Patient should contact 911 or have someone drive them to Emergency Dept promptly:      If Patient calling 911, inform 911 personal that you have tested positive for the Coronavirus (COVID-19).  Place mask on and await 911 to arrive.    If Emergency Dept, If possible, please have another adult drive you to the Emergency Dept but you need to wear mask when in contact with other people.      Treatment Options:   Patient classified as COVID treatment eligible by Epic high risk algorithm: No  You may be eligible to receive a new treatment with a monoclonal antibody for preventing hospitalization in patients at high risk for complications from COVID-19.  This medication is still experimental and available on a limited basis; it is given through an IV and must be given at an infusion center.  Please note that not all people who are eligible will receive the medication since it is in limited supply.   Is the patient symptomatic and onset of symptoms within the last 7 days? No. Patient does not qualify.    Review information with Patient    Your result was positive. This means you have COVID-19 (coronavirus).    How can I protect others?    These guidelines are for isolating before returning to work, school or .    If you DO have symptoms    Stay home and away from others     For at least 5 days after your symptoms started, AND    You are fever  free for 24 hours (with no medicine that reduces fever), AND    Your other symptoms are better    Wear a mask for 10 full days anytime you are around others    If you DON'T have symptoms    Stay home and away from others for at least 5 days after your positive test    Wear a mask for 10 full days anytime you are around others    There may be different guidelines for healthcare facilities.  Please check with the specific sites before arriving.    If you have been told by a doctor that you were severely ill with COVID-19 or are immunocompromised, you should isolate for at least 10 days.    You should not go back to work until you meet the guidelines above for ending your home isolation. You don't need to be retested for COVID-19 before going back to work--studies show that you won't spread the virus if it's been at least 10 days since your symptoms started (or 20 days, if you have a weak immune system).    Employers, schools, and daycares: This is an official notice for this person's medical guidelines for returning in-person.  They must meet the above guidelines before going back to work, school or  in person.    You will receive a positive COVID-19 letter via [employer] or the mail soon with additional self-care information.    Would you like me to review some of that information with you now?  No    If you were tested for an upcoming procedure, please contact your provider for next steps.    Mansi Campbell classified as COVID treatment eligible by Epic high risk algorithm:  No    Coronavirus (COVID-19) Notification    Reason for call  Notify of POSITIVE COVID-19 lab result, assess symptoms,  review [facility] recommendations    Lab Result   Lab test for 2019-nCoV rRt-PCR or SARS-COV-2 PCR  Oropharyngeal AND/OR nasopharyngeal swabs were POSITIVE for 2019-nCoV RNA [OR] SARS-COV-2 RNA (COVID-19) RNA     We have been unable to reach patient by phone at this time to notify of their Positive COVID-19  result.    Left voicemail message requesting a call back to [phone] [facility] for results.        A Positive COVID-19 letter will be sent via [employer] or the mail.    Mansi Benedict

## 2022-12-13 ENCOUNTER — OFFICE VISIT (OUTPATIENT)
Dept: FAMILY MEDICINE | Facility: CLINIC | Age: 37
End: 2022-12-13
Payer: COMMERCIAL

## 2022-12-13 VITALS
BODY MASS INDEX: 19.05 KG/M2 | RESPIRATION RATE: 15 BRPM | SYSTOLIC BLOOD PRESSURE: 111 MMHG | WEIGHT: 125.7 LBS | DIASTOLIC BLOOD PRESSURE: 70 MMHG | TEMPERATURE: 97.7 F | HEIGHT: 68 IN | OXYGEN SATURATION: 99 % | HEART RATE: 64 BPM

## 2022-12-13 DIAGNOSIS — N89.8 VAGINAL DRYNESS: ICD-10-CM

## 2022-12-13 DIAGNOSIS — Z00.00 ROUTINE GENERAL MEDICAL EXAMINATION AT A HEALTH CARE FACILITY: Primary | ICD-10-CM

## 2022-12-13 DIAGNOSIS — Z80.8 FAMILY HISTORY OF MALIGNANT MELANOMA OF SKIN: ICD-10-CM

## 2022-12-13 PROBLEM — Z3A.40 40 WEEKS GESTATION OF PREGNANCY: Status: RESOLVED | Noted: 2022-05-19 | Resolved: 2022-12-13

## 2022-12-13 PROBLEM — Z34.00 SUPERVISION OF NORMAL FIRST PREGNANCY, ANTEPARTUM: Status: RESOLVED | Noted: 2022-03-28 | Resolved: 2022-12-13

## 2022-12-13 PROCEDURE — 99395 PREV VISIT EST AGE 18-39: CPT | Performed by: PHYSICIAN ASSISTANT

## 2022-12-13 ASSESSMENT — ENCOUNTER SYMPTOMS
SORE THROAT: 0
PARESTHESIAS: 0
NAUSEA: 0
MYALGIAS: 0
FEVER: 0
EYE PAIN: 0
CHILLS: 0
SHORTNESS OF BREATH: 0
HEMATURIA: 0
HEMATOCHEZIA: 0
BREAST MASS: 0
DYSURIA: 0
DIARRHEA: 0
HEADACHES: 0
CONSTIPATION: 0
HEARTBURN: 0
WEAKNESS: 0
DIZZINESS: 0
COUGH: 0
PALPITATIONS: 0
FREQUENCY: 0
ABDOMINAL PAIN: 0
NERVOUS/ANXIOUS: 0
ARTHRALGIAS: 0
JOINT SWELLING: 0

## 2022-12-13 ASSESSMENT — PAIN SCALES - GENERAL: PAINLEVEL: NO PAIN (0)

## 2022-12-13 NOTE — PROGRESS NOTES
SUBJECTIVE:   CC: Nabila is an 37 year old who presents for preventive health visit.     Patient has been advised of split billing requirements and indicates understanding: Yes     Healthy Habits:     Getting at least 3 servings of Calcium per day:  Yes    Bi-annual eye exam:  Yes    Dental care twice a year:  Yes    Sleep apnea or symptoms of sleep apnea:  None    Diet:  Regular (no restrictions)    Frequency of exercise:  1 day/week    Duration of exercise:  Less than 15 minutes    Taking medications regularly:  Yes    Medication side effects:  Not applicable    PHQ-2 Total Score: 0    Additional concerns today:  Yes    She is 6 months post partum, daughter Martha, . She is breastfeeding.   Taking norethindrone and doing well with this.    She has noticed some discomfort at times with intercourse since her delivery. She feels like there is not as much vaginal lubrication. She has tried lubrication but it didn't help significantly. No internal pain with intercourse, just some vaginal discomfort related to vaginal dryness. She is breastfeeding.      Today's PHQ-2 Score:   PHQ-2 (  [employer]) 2022   Q1: Little interest or pleasure in doing things 0   Q2: Feeling down, depressed or hopeless 0   PHQ-2 Score 0   PHQ-2 Total Score (12-17 Years)- Positive if 3 or more points; Administer PHQ-A if positive -   Q1: Little interest or pleasure in doing things Not at all   Q2: Feeling down, depressed or hopeless Not at all   PHQ-2 Score 0       Have you ever done Advance Care Planning? (For example, a Health Directive, POLST, or a discussion with a medical provider or your loved ones about your wishes): No, advance care planning information given to patient to review.  Patient declined advance care planning discussion at this time.    Social History     Tobacco Use     Smoking status: Former     Packs/day: 1.00     Years: 4.00     Pack years: 4.00     Types: Cigarettes     Quit date: 2017     Years since  quittin.9     Smokeless tobacco: Never   Substance Use Topics     Alcohol use: Not Currently     Alcohol/week: 4.2 standard drinks     Types: 5 Standard drinks or equivalent per week     Comment: 1 glass of wine daily     If you drink alcohol do you typically have >3 drinks per day or >7 drinks per week? No    Alcohol Use 2022   Prescreen: >3 drinks/day or >7 drinks/week? No   Prescreen: >3 drinks/day or >7 drinks/week? -   AUDIT SCORE  -       Reviewed orders with patient.  Reviewed health maintenance and updated orders accordingly - Yes  Labs reviewed in EPIC  BP Readings from Last 3 Encounters:   22 111/70   22 124/76   22 110/72    Wt Readings from Last 3 Encounters:   22 57 kg (125 lb 11.2 oz)   22 62.4 kg (137 lb 9.6 oz)   22 64.4 kg (142 lb)                  Patient Active Problem List   Diagnosis     CARDIOVASCULAR SCREENING; LDL GOAL LESS THAN 160     Benign neoplasm of skin     Family history of malignant melanoma of skin     History of abnormal Pap smear     Excessive or frequent menstruation     Past Surgical History:   Procedure Laterality Date      SECTION N/A 2022    Procedure:  SECTION;  Surgeon: Valentin Singh MD;  Location:  L+D     DILATION AND CURETTAGE, HYSTEROSCOPY DIAGNOSTIC, COMBINED N/A 2016    Procedure: COMBINED DILATION AND CURETTAGE, HYSTEROSCOPY DIAGNOSTIC;  Surgeon: Aj Clemons MD;  Location:  OR     SURGICAL HISTORY OF -   4/10    colposcopy; mild dysplasia; no treatment.     SURGICAL HISTORY OF -       colposcopy; recommended repeat       Social History     Tobacco Use     Smoking status: Former     Packs/day: 1.00     Years: 4.00     Pack years: 4.00     Types: Cigarettes     Quit date:      Years since quittin.9     Smokeless tobacco: Never   Substance Use Topics     Alcohol use: Not Currently     Alcohol/week: 4.2 standard drinks     Types: 5 Standard drinks or equivalent per week      Comment: 1 glass of wine daily     Family History   Problem Relation Age of Onset     Cancer Mother         melanoma     Heart Disease Mother         murmur     Thyroid Disease Mother      Aneurysm Father         brain calcified aneurysm     Prostate Cancer Father      Polycystic ovary syndrome Sister      Rheumatoid Arthritis Sister      LUZAROLANDA. Paternal Grandmother         valve replacement     Breast Cancer Paternal Grandmother      CNattyANattyD. Paternal Grandfather         triple bypass     Prostate Cancer Paternal Grandfather      Diabetes Other         mom's side of family; not immediate     Breast Cancer Paternal Aunt          Current Outpatient Medications   Medication Sig Dispense Refill     lanolin ointment Apply topically every hour as needed for dry skin (soreness) 7 g 0     norethindrone (MICRONOR) 0.35 MG tablet Take 1 tablet (0.35 mg) by mouth daily 84 tablet 3     Prenatal Vit-Fe Fumarate-FA (PRENATAL MULTIVITAMIN W/IRON) 27-0.8 MG tablet Take 1 tablet by mouth daily 90 tablet 3     No Known Allergies  Recent Labs   Lab Test 07/07/20  0826 05/14/20  1433 01/16/18  1621   LDL 84  --   --    HDL 60  --   --    TRIG 75  --   --    ALT  --  16  --    CR  --  0.62  --    GFRESTIMATED  --  >90  --    GFRESTBLACK  --  >90  --    POTASSIUM  --  3.8  --    TSH  --  1.31 1.83        Breast Cancer Screening:    FHS-7:   Breast CA Risk Assessment (FHS-7) 12/13/2022   Did any of your first-degree relatives have breast or ovarian cancer? No   Did any of your relatives have bilateral breast cancer? Yes   Did any man in your family have breast cancer? No   Did any woman in your family have breast and ovarian cancer? Yes   Did any woman in your family have breast cancer before age 50 y? Unknown   Do you have 2 or more relatives with breast and/or ovarian cancer? Yes   Do you have 2 or more relatives with breast and/or bowel cancer? Yes     Paternal grandma and paternal aunt had breast cancer. No other family history of  breast, ovarian, or colon cancer.    Patient under 40 years of age: Routine Mammogram Screening not recommended.   Pertinent mammograms are reviewed under the imaging tab.    History of abnormal Pap smear:   PAP / HPV Latest Ref Rng & Units 10/21/2021 2017 3/27/2013   PAP   Negative for Intraepithelial Lesion or Malignancy (NILM) - -   PAP (Historical) - - NIL NIL   HPV16 Negative Negative Negative -   HPV18 Negative Negative Negative -   HRHPV Negative Negative Negative -     Reviewed and updated as needed this visit by clinical staff   Tobacco  Allergies  Meds  Problems  Med Hx  Surg Hx  Fam Hx          Reviewed and updated as needed this visit by Provider   Tobacco  Allergies  Meds  Problems  Med Hx  Surg Hx  Fam Hx         Past Medical History:   Diagnosis Date     Abnormal Pap smear of cervix 2009     Endocrine disease      Fibroid      LSIL (low grade squamous intraepithelial lesion) on Pap smear 2010    STEPHANIE I on colp 2010     SIADH (syndrome of inappropriate ADH production) (H) 2011    hospitalized. will see Endocrinologist     Varicella       Past Surgical History:   Procedure Laterality Date      SECTION N/A 2022    Procedure:  SECTION;  Surgeon: Valentin Singh MD;  Location:  L+D     DILATION AND CURETTAGE, HYSTEROSCOPY DIAGNOSTIC, COMBINED N/A 2016    Procedure: COMBINED DILATION AND CURETTAGE, HYSTEROSCOPY DIAGNOSTIC;  Surgeon: Aj Clemons MD;  Location:  OR     SURGICAL HISTORY OF -   4/10    colposcopy; mild dysplasia; no treatment.     SURGICAL HISTORY OF -       colposcopy; recommended repeat     OB History    Para Term  AB Living   1 1 1 0 0 1   SAB IAB Ectopic Multiple Live Births   0 0 0 0 1      # Outcome Date GA Lbr Josh/2nd Weight Sex Delivery Anes PTL Lv   1 Term 22 40w1d  3.36 kg (7 lb 6.5 oz) F CS-LTranv EPI N RIO      Complications: Failure to Progress in First Stage      Name: SAADMONCHO-INDY      " Apgar1: 8  Apgar5: 9       Review of Systems   Constitutional: Negative for chills and fever.   HENT: Negative for congestion, ear pain, hearing loss and sore throat.    Eyes: Negative for pain and visual disturbance.   Respiratory: Negative for cough and shortness of breath.    Cardiovascular: Negative for chest pain, palpitations and peripheral edema.   Gastrointestinal: Negative for abdominal pain, constipation, diarrhea, heartburn, hematochezia and nausea.   Breasts:  Negative for tenderness, breast mass and discharge.   Genitourinary: Negative for dysuria, frequency, genital sores, hematuria, pelvic pain, urgency, vaginal bleeding and vaginal discharge.   Musculoskeletal: Negative for arthralgias, joint swelling and myalgias.   Skin: Negative for rash.   Neurological: Negative for dizziness, weakness, headaches and paresthesias.   Psychiatric/Behavioral: Negative for mood changes. The patient is not nervous/anxious.         OBJECTIVE:   /70 (BP Location: Right arm, Patient Position: Sitting, Cuff Size: Adult Regular)   Pulse 64   Temp 97.7  F (36.5  C) (Oral)   Resp 15   Ht 1.727 m (5' 8\")   Wt 57 kg (125 lb 11.2 oz)   LMP 08/11/2021 (Approximate)   SpO2 99%   Breastfeeding Yes   BMI 19.11 kg/m    Physical Exam  GENERAL: healthy, alert and no distress  EYES: Eyes grossly normal to inspection, PERRL and conjunctivae and sclerae normal  HENT: ear canals and TM's normal, nose and mouth without ulcers or lesions  NECK: no adenopathy, no asymmetry, masses, or scars and thyroid normal to palpation  RESP: lungs clear to auscultation - no rales, rhonchi or wheezes  BREAST: normal without masses, tenderness or nipple discharge and no palpable axillary masses or adenopathy  CV: regular rate and rhythm, normal S1 S2, no S3 or S4, no murmur, click or rub, no peripheral edema and peripheral pulses strong  ABDOMEN: soft, nontender, no hepatosplenomegaly, no masses and bowel sounds normal  MS: no gross " "musculoskeletal defects noted, no edema  SKIN: no suspicious lesions or rashes  NEURO: Normal strength and tone, mentation intact and speech normal  PSYCH: mentation appears normal, affect normal/bright    Diagnostic Test Results:  Labs reviewed in Epic    ASSESSMENT/PLAN:   Routine general medical examination at a health care facility  Reviewed personal and family history. Reviewed age appropriate screenings. Reviewed healthy BP and BMI ranges. Counseled on lifestyle modifications for optimal mental and physical health.  Discussed age-appropriate health maintenance. Recommended any needed vaccinations. Continue to focus on well balanced diet and exercise. She had COVID recently and will wait to get COVID booster until 90 days after COVID.  - Adult Dermatology Referral; Future    Family history of malignant melanoma of skin  Mom had melanoma. Recommend skin exam with dermatology.    Vaginal dryness  Discussed this can be normal in the post partum period and while breastfeeding. Recommend vaginal moisturizer along with vaginal lubricant during intercourse. Monitor and follow-up if no improvement or worsening.    COUNSELING:  Reviewed preventive health counseling, as reflected in patient instructions      BMI:   Estimated body mass index is 19.11 kg/m  as calculated from the following:    Height as of this encounter: 1.727 m (5' 8\").    Weight as of this encounter: 57 kg (125 lb 11.2 oz).         She reports that she quit smoking about 5 years ago. Her smoking use included cigarettes. She has a 4.00 pack-year smoking history. She has never used smokeless tobacco.      Poonam Casanova PA-C  [facility]  "

## 2023-03-31 ENCOUNTER — IMMUNIZATION (OUTPATIENT)
Dept: FAMILY MEDICINE | Facility: CLINIC | Age: 38
End: 2023-03-31
Payer: COMMERCIAL

## 2023-03-31 DIAGNOSIS — Z23 HIGH PRIORITY FOR 2019-NCOV VACCINE: Primary | ICD-10-CM

## 2023-03-31 PROCEDURE — 0124A COVID-19 VACCINE BIVALENT BOOSTER 12+ (PFIZER): CPT

## 2023-03-31 PROCEDURE — 91312 COVID-19 VACCINE BIVALENT BOOSTER 12+ (PFIZER): CPT

## 2023-05-16 NOTE — PROGRESS NOTES
[facility] Dermatology Note  Encounter Date: May 18, 2023  Office Visit     Dermatology Problem List:  # Family hx melanoma - mother (diagnosed in 50's), paternal grandmother  # Onychomycosis, s/p terbinafine  # Halo nevus, R upper arm  ____________________________________________    Assessment & Plan:     # Benign skin findings - benign nevi, solar lentigines, cherry angiomas, halo nevi.  Per patient, she denies having any other systemic symptoms or previous abnormal thyroid labs suggestive of an underlying thyroid disorder. Halo nevus under dermoscopy appears reassuring today. There is no need for further treatment of these benign skin findings today, though I will have her watch the R upper arm closely and schedule a follow up skin exam in 1 year. Discussed signs/symptoms concerning for NMSC and melanoma.  - ABCDEs: Counseled ABCDEs of melanoma: Asymmetry, Border (irregularity), Color (not uniform, changes in color), Diameter (greater than 6 mm which is about the size of a pencil eraser), and Evolving (any changes in preexisting moles).  - Sun protection: Counseled SPF30+ sunscreen, UPF clothing, sun avoidance, tanning bed avoidance.    # Scar, .  Has not previously treated this scar before. Would recommend initial treatment with OTC silicone sheets. Discussed that it can take months to experience noticeable changes in the scar's appearance.    Procedures Performed:   None    Follow-up: 1 year(s) in-person, or earlier for new or changing lesions    Staff and Resident:     Tequila Do MD  PGY2 Dermatology Resident    I, Rosalinda Bullock MD, saw this patient with the resident and agree with the resident s findings and plan of care as documented in the resident s note.    ____________________________________________    CC: Skin Check (FBSE.  Spot of concern on the left elbow, red dot that is sore to touch.  )    HPI:  Ms. Nabila Zapata is a(n) 37 year old female who presents today as a  new patient for a FBSE. She was last seen in  in our clinic for management of onychomycosis.    Nabila states that she has a spot on the L elbow, longstanding, more inflamed over the past 2-3 months (not currently). Wants to ensure that this spot is not concerning. Otherwise, she denies having any other skin concerns, states that there is nothing else that is new, growing, changing, tender, crusting, or bleeding. Also, Nabila is curious about possible scar creams for her  scar. Reports having  1 year ago, has not used anything at home.     Patient denies having a personal history of skin cancer. States that her mother and paternal grandmother have a history of melanoma - mother was diagnosed in her 50's. Reports using tanning beds in high school (~20 sessions total). Has been using sunscreen routinely when outside.     Patient is otherwise feeling well, without additional skin concerns.    Labs Reviewed:  N/A    Physical Exam:  Vitals: There were no vitals taken for this visit.  SKIN: Full skin, which includes the head/face, both arms, chest, back, abdomen,both legs, genitalia and/or groin buttocks, digits and/or nails, was examined.  - There is one solitary small few millimeter dark brown evenly pigmented macule with surrounding hypopigmentation on the R upper arm.   - There are dome shaped bright red and pink papules on the trunk and extremities (including the L elbow).   - Multiple regular brown and dark brown pigmented macules and papules are identified on the trunk and extremities.   - Scattered brown macules on sun exposed areas..   - Well healed pink  scar on the inferior abdomen.  - No other lesions of concern on areas examined.     Medications:  Current Outpatient Medications   Medication     norethindrone (MICRONOR) 0.35 MG tablet     Prenatal Vit-Fe Fumarate-FA (PRENATAL MULTIVITAMIN W/IRON) 27-0.8 MG tablet     lanolin ointment     No current facility-administered medications  for this visit.      Past Medical History:   Patient Active Problem List   Diagnosis     CARDIOVASCULAR SCREENING; LDL GOAL LESS THAN 160     Benign neoplasm of skin     Family history of malignant melanoma of skin     History of abnormal Pap smear     Excessive or frequent menstruation     Past Medical History:   Diagnosis Date     Abnormal Pap smear of cervix 12/2009     Endocrine disease      Fibroid      LSIL (low grade squamous intraepithelial lesion) on Pap smear 2009, 2010    STEPHANIE I on colp 4/2010     SIADH (syndrome of inappropriate ADH production) (H) 09/2011    hospitalized. will see Endocrinologist     Varicella      CC Poonam Casanova PA-C  [address] [place] [address] on close of this encounter.

## 2023-05-18 ENCOUNTER — OFFICE VISIT (OUTPATIENT)
Dept: DERMATOLOGY | Facility: CLINIC | Age: 38
End: 2023-05-18
Payer: COMMERCIAL

## 2023-05-18 DIAGNOSIS — D22.9 MULTIPLE BENIGN NEVI: Primary | ICD-10-CM

## 2023-05-18 DIAGNOSIS — Z80.8 FAMILY HISTORY OF MALIGNANT MELANOMA OF SKIN: ICD-10-CM

## 2023-05-18 DIAGNOSIS — D18.01 CHERRY ANGIOMA: ICD-10-CM

## 2023-05-18 DIAGNOSIS — D22.9 NEVUS, HALO: ICD-10-CM

## 2023-05-18 DIAGNOSIS — L81.4 SOLAR LENTIGO: ICD-10-CM

## 2023-05-18 DIAGNOSIS — Z00.00 ROUTINE GENERAL MEDICAL EXAMINATION AT A HEALTH CARE FACILITY: ICD-10-CM

## 2023-05-18 PROCEDURE — 99203 OFFICE O/P NEW LOW 30 MIN: CPT | Mod: GC | Performed by: STUDENT IN AN ORGANIZED HEALTH CARE EDUCATION/TRAINING PROGRAM

## 2023-05-18 ASSESSMENT — PAIN SCALES - GENERAL: PAINLEVEL: NO PAIN (0)

## 2023-05-18 NOTE — NURSING NOTE
Dermatology Rooming Note    Nabila Zapata's goals for this visit include:   Chief Complaint   Patient presents with     Skin Check     FBSE.  Spot of concern on the left elbow, red dot that is sore to touch.       Char Diaz, CMA

## 2023-05-18 NOTE — PATIENT INSTRUCTIONS
Try silicone sheets over the counter for your  scar.    Patient Education     Checking for Skin Cancer  You can find cancer early by checking your skin each month. There are 3 kinds of skin cancer. They are melanoma, basal cell carcinoma, and squamous cell carcinoma. Doing monthly skin checks is the best way to find new marks or skin changes. Follow the instructions below for checking your skin.   The ABCDEs of checking moles for melanoma   Check your moles or growths for signs of melanoma using ABCDE:   Asymmetry: the sides of the mole or growth don t match  Border: the edges are ragged, notched, or blurred  Color: the color within the mole or growth varies  Diameter: the mole or growth is larger than 6 mm (size of a pencil eraser)  Evolving: the size, shape, or color of the mole or growth is changing (evolving is not shown in the images below)    Checking for other types of skin cancer  Basal cell carcinoma or squamous cell carcinoma have symptoms such as:     A spot or mole that looks different from all other marks on your skin  Changes in how an area feels, such as itching, tenderness, or pain  Changes in the skin's surface, such as oozing, bleeding, or scaliness  A sore that does not heal  New swelling or redness beyond the border of a mole    Who s at risk?  Anyone can get skin cancer. But you are at greater risk if you have:   Fair skin, light-colored hair, or light-colored eyes  Many moles or abnormal moles on your skin  A history of sunburns from sunlight or tanning beds  A family history of skin cancer  A history of exposure to radiation or chemicals  A weakened immune system  If you have had skin cancer in the past, you are at risk for recurring skin cancer.   How to check your skin  Do your monthly skin checkups in front of a full-length mirror. Check all parts of your body, including your:   Head (ears, face, neck, and scalp)  Torso (front, back, and sides)  Arms (tops, undersides, upper, and  lower armpits)  Hands (palms, backs, and fingers, including under the nails)  Buttocks and genitals  Legs (front, back, and sides)  Feet (tops, soles, toes, including under the nails, and between toes)  If you have a lot of moles, take digital photos of them each month. Make sure to take photos both up close and from a distance. These can help you see if any moles change over time.   Most skin changes are not cancer. But if you see any changes in your skin, call your doctor right away. Only he or she can diagnose a problem. If you have skin cancer, seeing your doctor can be the first step toward getting the treatment that could save your life.   [employer] last reviewed this educational content on 4/1/2019 2000-2020 [employer]. [address] [place] [address]. All rights reserved. This information is not intended as a substitute for professional medical care. Always follow your healthcare professional's instructions.       When should I call my doctor?  If you are worsening or not improving, please, contact us or seek urgent care as noted below.     Who should I call with questions (adults)?  [facility] (adult and pediatric): [phone]  [facility] (adult): [phone]  For urgent needs outside of business hours call the [facility] at [phone] and ask for the dermatology resident on call to be paged  If this is a medical emergency and you are unable to reach an ER, Call [phone]    Who should I call with questions (pediatric)?  [facility]- Pediatric Dermatology  Dr. Nisreen Mcdaniel, Dr. Vanita Cavanaugh, Dr. Lilly Jaimes, Vicki Welch, PA, Dr. Abiola Blackburn, Dr. Elisabeth Casper & Dr. Prem Mccarthy  Non-urgent nurse triage line; [phone]- Leta and Ayaka ALLAN Care Coordinatorklarissa Carreon (/Complex ) [phone]    If you need a prescription refill, please contact  your pharmacy. Refills are approved or denied by our Physicians during normal business hours, Monday through Fridays  Per office policy, refills will not be granted if you have not been seen within the past year (or sooner depending on your child's condition)    Scheduling Information:  Pediatric Appointment Scheduling and Call Center [phone]  Radiology Scheduling- [phone]  Sedation Unit Scheduling- [phone]  [place] Scheduling- [facility] [phone]; Pediatric Dermatology [phone]  Main  Services: [phone]  Kiswahili: [phone]  [place] Islander: [phone]  Hmong/Hussein/Lao: [phone]  Preadmission Nursing Department Fax Number: [phone] (Fax all pre-operative paperwork to this number)    For urgent matters arising during evenings, weekends, or holidays that cannot wait for normal business hours please call [phone] and ask for the dermatology resident on call to be paged.

## 2023-06-08 DIAGNOSIS — Z30.011 ENCOUNTER FOR INITIAL PRESCRIPTION OF CONTRACEPTIVE PILLS: ICD-10-CM

## 2023-06-09 RX ORDER — ACETAMINOPHEN AND CODEINE PHOSPHATE 120; 12 MG/5ML; MG/5ML
0.35 SOLUTION ORAL DAILY
Qty: 84 TABLET | Refills: 0 | Status: SHIPPED | OUTPATIENT
Start: 2023-06-09 | End: 2023-09-06

## 2023-07-10 ENCOUNTER — MYC MEDICAL ADVICE (OUTPATIENT)
Dept: FAMILY MEDICINE | Facility: CLINIC | Age: 38
End: 2023-07-10
Payer: COMMERCIAL

## 2023-07-25 ENCOUNTER — OFFICE VISIT (OUTPATIENT)
Dept: FAMILY MEDICINE | Facility: CLINIC | Age: 38
End: 2023-07-25
Payer: COMMERCIAL

## 2023-07-25 VITALS
SYSTOLIC BLOOD PRESSURE: 125 MMHG | TEMPERATURE: 97.6 F | WEIGHT: 122.2 LBS | BODY MASS INDEX: 18.52 KG/M2 | OXYGEN SATURATION: 100 % | DIASTOLIC BLOOD PRESSURE: 80 MMHG | RESPIRATION RATE: 14 BRPM | HEIGHT: 68 IN | HEART RATE: 77 BPM

## 2023-07-25 DIAGNOSIS — D22.9 HALO NEVUS: ICD-10-CM

## 2023-07-25 DIAGNOSIS — L65.9 HAIR THINNING: Primary | ICD-10-CM

## 2023-07-25 LAB
FERRITIN SERPL-MCNC: 63 NG/ML (ref 6–175)
IRON BINDING CAPACITY (ROCHE): 299 UG/DL (ref 240–430)
IRON SATN MFR SERPL: 42 % (ref 15–46)
IRON SERPL-MCNC: 126 UG/DL (ref 37–145)
TSH SERPL DL<=0.005 MIU/L-ACNC: 3.24 UIU/ML (ref 0.3–4.2)

## 2023-07-25 PROCEDURE — 83540 ASSAY OF IRON: CPT | Performed by: PHYSICIAN ASSISTANT

## 2023-07-25 PROCEDURE — 83550 IRON BINDING TEST: CPT | Performed by: PHYSICIAN ASSISTANT

## 2023-07-25 PROCEDURE — 99214 OFFICE O/P EST MOD 30 MIN: CPT | Performed by: PHYSICIAN ASSISTANT

## 2023-07-25 PROCEDURE — 36415 COLL VENOUS BLD VENIPUNCTURE: CPT | Performed by: PHYSICIAN ASSISTANT

## 2023-07-25 PROCEDURE — 82728 ASSAY OF FERRITIN: CPT | Performed by: PHYSICIAN ASSISTANT

## 2023-07-25 PROCEDURE — 84443 ASSAY THYROID STIM HORMONE: CPT | Performed by: PHYSICIAN ASSISTANT

## 2023-07-25 ASSESSMENT — PAIN SCALES - GENERAL: PAINLEVEL: NO PAIN (0)

## 2023-07-25 NOTE — PROGRESS NOTES
Assessment & Plan     Hair thinning  Ongoing for past 2 months, diffuse, no patches of hair loss. Start with labs below and follow-up per results.   - TSH with free T4 reflex; Future  - Ferritin; Future  - Iron and iron binding capacity; Future  - TSH with free T4 reflex  - Ferritin  - Iron and iron binding capacity    Halo nevus  Saw dermatologist in May 2023 who noted halo nevus on right upper arm that can be associated with thyroid issues. Will check thyroid as above    Poonam Casanova PA-C  [facility] MICHAEL Dahl is a 37 year old, presenting for the following health issues:  Thyroid Problem (Thyroid check discussion)        7/25/2023     1:35 PM   Additional Questions   Roomed by Iveth LAWRENCE     History of Present Illness       Reason for visit:  Discuss thyroid  Symptom onset:  More than a month  Symptoms include:  Hairloss, fatigue  Symptom intensity:  Mild  Symptom progression:  Staying the same  Had these symptoms before:  No    She eats 2-3 servings of fruits and vegetables daily.She consumes 0 sweetened beverage(s) daily.She exercises with enough effort to increase her heart rate 10 to 19 minutes per day.  She exercises with enough effort to increase her heart rate 5 days per week.   She is taking medications regularly.     Saw dermatologist in May 2023 who noted halo nevus on right upper arm that can be associated with thyroid issues.     She has noticed diffuse hair thinning over the past 2 months and recently saw her  who asked her if she has had her thyroid checked. No patches of hair loss. No recent illness. Her daughter is 14 months old and she stopped breastfeeding 2 months ago and thought the hair loss could have been related to that. She did not have a lot of postpartum hair loss.    With the above issues, she wanted to have labs done today. She does have family history of hypothyroidism in her mom and cousin. No personal history of thyroid  "problems.     No weight changes, constipation, dry skin, mood changes. She does feel fatigued but thought that was related to having a 14 month old daughter, even though she has generally been a good sleeper.     She is taking norethindrone for OCP (started shortly after she had her daughter). Not having regular periods. Has not had period since starting OCP.    Review of Systems   Constitutional, HEENT, cardiovascular, pulmonary, gi and gu systems are negative, except as otherwise noted.      Objective    /80 (BP Location: Left arm, Patient Position: Sitting, Cuff Size: Adult Regular)   Pulse 77   Temp 97.6  F (36.4  C) (Oral)   Resp 14   Ht 1.727 m (5' 8\")   Wt 55.4 kg (122 lb 3.2 oz)   LMP  (LMP Unknown)   SpO2 100%   BMI 18.58 kg/m    Body mass index is 18.58 kg/m .  Physical Exam   GENERAL: healthy, alert and no distress  NECK: no adenopathy, no asymmetry, masses, or scars and thyroid normal to palpation  RESP: lungs clear to auscultation - no rales, rhonchi or wheezes  CV: regular rate and rhythm, normal S1 S2, no S3 or S4, no murmur  PSYCH: mentation appears normal, affect normal/bright      "

## 2023-09-03 DIAGNOSIS — Z30.011 ENCOUNTER FOR INITIAL PRESCRIPTION OF CONTRACEPTIVE PILLS: ICD-10-CM

## 2023-09-06 RX ORDER — ACETAMINOPHEN AND CODEINE PHOSPHATE 120; 12 MG/5ML; MG/5ML
0.35 SOLUTION ORAL DAILY
Qty: 84 TABLET | Refills: 0 | Status: SHIPPED | OUTPATIENT
Start: 2023-09-06 | End: 2023-12-04

## 2023-10-16 ENCOUNTER — TELEPHONE (OUTPATIENT)
Dept: DERMATOLOGY | Facility: CLINIC | Age: 38
End: 2023-10-16
Payer: COMMERCIAL

## 2023-10-16 NOTE — TELEPHONE ENCOUNTER
Spoke with patient regarding scheduling 1 year follow up patient is scheduled accordingly and aware of appt time, date, and location.

## 2023-11-13 NOTE — PROGRESS NOTES
Pt comfortable with epidural    FHT Category 2  (moderate variability; some mild variable decelerations)    Oxytocin augmentation started ~2.5 hours ago  When I examined the patient 30 minutes ago, oxytocin @ 2 mU/min    Cervical exam 30 minutes ago: 6-7 / 0.5 cm long / vtx -1-2 / OP    >>>protracted progress in active labor    Fetus in occiput posterior position    Clinical EFW ~7.5-8 lb  (no recent US EFW per pt)    Fetal status reassuring / stable    I initially recommended to the patient that we place an IUPC to maximize efficiency of her labor management / oxytocin administration    Ideally, we would then increase the oxytocin over the next few hours so as to achieve adequate labor    This would hopefully allow for appropriate descent & rotation of fetal vertex; if this did not occur, then she would be a candidate for a  section delivery (eg arrest of dilatation / descent in active labor)    The patient & her  have considered this and asked appropriate questions; it is their preference to proceed with  section delivery @ this time (eg they wish not to further proceed with labor & attempt @ vaginal delivery)    Oxytocin discontinued    Pt consented for  section (aware of surgical risks, including but not limited to: bleeding requiring transfusion, infection, injury to involved / adjacent organs)    Epidural in excellent working order    Francisco BROWER     not applicable (Male)

## 2023-12-04 DIAGNOSIS — Z30.011 ENCOUNTER FOR INITIAL PRESCRIPTION OF CONTRACEPTIVE PILLS: ICD-10-CM

## 2023-12-04 RX ORDER — ACETAMINOPHEN AND CODEINE PHOSPHATE 120; 12 MG/5ML; MG/5ML
0.35 SOLUTION ORAL DAILY
Qty: 84 TABLET | Refills: 0 | Status: SHIPPED | OUTPATIENT
Start: 2023-12-04 | End: 2024-05-16

## 2024-01-26 ENCOUNTER — OFFICE VISIT (OUTPATIENT)
Dept: FAMILY MEDICINE | Facility: CLINIC | Age: 39
End: 2024-01-26
Attending: PHYSICIAN ASSISTANT
Payer: COMMERCIAL

## 2024-01-26 VITALS
RESPIRATION RATE: 14 BRPM | OXYGEN SATURATION: 98 % | DIASTOLIC BLOOD PRESSURE: 78 MMHG | HEIGHT: 68 IN | TEMPERATURE: 99.2 F | BODY MASS INDEX: 19.26 KG/M2 | WEIGHT: 127.1 LBS | SYSTOLIC BLOOD PRESSURE: 120 MMHG | HEART RATE: 89 BPM

## 2024-01-26 DIAGNOSIS — M54.50 CHRONIC BILATERAL LOW BACK PAIN WITHOUT SCIATICA: ICD-10-CM

## 2024-01-26 DIAGNOSIS — Z13.220 LIPID SCREENING: ICD-10-CM

## 2024-01-26 DIAGNOSIS — Z30.011 ENCOUNTER FOR INITIAL PRESCRIPTION OF CONTRACEPTIVE PILLS: ICD-10-CM

## 2024-01-26 DIAGNOSIS — M25.531 PAIN IN BOTH WRISTS: ICD-10-CM

## 2024-01-26 DIAGNOSIS — L70.0 ACNE VULGARIS: ICD-10-CM

## 2024-01-26 DIAGNOSIS — M25.532 PAIN IN BOTH WRISTS: ICD-10-CM

## 2024-01-26 DIAGNOSIS — Z00.00 ROUTINE GENERAL MEDICAL EXAMINATION AT A HEALTH CARE FACILITY: Primary | ICD-10-CM

## 2024-01-26 DIAGNOSIS — Z13.1 SCREENING FOR DIABETES MELLITUS: ICD-10-CM

## 2024-01-26 DIAGNOSIS — G89.29 CHRONIC BILATERAL LOW BACK PAIN WITHOUT SCIATICA: ICD-10-CM

## 2024-01-26 DIAGNOSIS — Z83.49 FAMILY HISTORY OF THYROID DISEASE: ICD-10-CM

## 2024-01-26 LAB
CHOLEST SERPL-MCNC: 167 MG/DL
FASTING STATUS PATIENT QL REPORTED: YES
FASTING STATUS PATIENT QL REPORTED: YES
GLUCOSE SERPL-MCNC: 89 MG/DL (ref 70–99)
HDLC SERPL-MCNC: 64 MG/DL
LDLC SERPL CALC-MCNC: 95 MG/DL
NONHDLC SERPL-MCNC: 103 MG/DL
TRIGL SERPL-MCNC: 42 MG/DL
TSH SERPL DL<=0.005 MIU/L-ACNC: 3.42 UIU/ML (ref 0.3–4.2)

## 2024-01-26 PROCEDURE — 99214 OFFICE O/P EST MOD 30 MIN: CPT | Mod: 25 | Performed by: PHYSICIAN ASSISTANT

## 2024-01-26 PROCEDURE — 80061 LIPID PANEL: CPT | Performed by: PHYSICIAN ASSISTANT

## 2024-01-26 PROCEDURE — 84443 ASSAY THYROID STIM HORMONE: CPT | Performed by: PHYSICIAN ASSISTANT

## 2024-01-26 PROCEDURE — 82947 ASSAY GLUCOSE BLOOD QUANT: CPT | Performed by: PHYSICIAN ASSISTANT

## 2024-01-26 PROCEDURE — 36415 COLL VENOUS BLD VENIPUNCTURE: CPT | Performed by: PHYSICIAN ASSISTANT

## 2024-01-26 PROCEDURE — 90480 ADMN SARSCOV2 VAC 1/ONLY CMP: CPT | Performed by: PHYSICIAN ASSISTANT

## 2024-01-26 PROCEDURE — 91320 SARSCV2 VAC 30MCG TRS-SUC IM: CPT | Performed by: PHYSICIAN ASSISTANT

## 2024-01-26 PROCEDURE — 99395 PREV VISIT EST AGE 18-39: CPT | Performed by: PHYSICIAN ASSISTANT

## 2024-01-26 RX ORDER — ETHYNODIOL DIACETATE AND ETHINYL ESTRADIOL 1 MG-35MCG
1 KIT ORAL DAILY
Qty: 84 TABLET | Refills: 3 | Status: SHIPPED | OUTPATIENT
Start: 2024-01-26

## 2024-01-26 RX ORDER — TRETINOIN 0.25 MG/G
CREAM TOPICAL AT BEDTIME
Qty: 45 G | Refills: 4 | Status: SHIPPED | OUTPATIENT
Start: 2024-01-26

## 2024-01-26 ASSESSMENT — ENCOUNTER SYMPTOMS
CHILLS: 0
WEAKNESS: 0
BREAST MASS: 0
HEADACHES: 0
SORE THROAT: 0
FREQUENCY: 0
CONSTIPATION: 0
DIARRHEA: 0
DYSURIA: 0
COUGH: 0
NERVOUS/ANXIOUS: 0
PARESTHESIAS: 0
JOINT SWELLING: 0
HEMATOCHEZIA: 0
HEARTBURN: 0
HEMATURIA: 0
ABDOMINAL PAIN: 0
PALPITATIONS: 0
ARTHRALGIAS: 0
FEVER: 0
SHORTNESS OF BREATH: 0
EYE PAIN: 0
DIZZINESS: 0
MYALGIAS: 0
NAUSEA: 0

## 2024-01-26 ASSESSMENT — PAIN SCALES - GENERAL: PAINLEVEL: MILD PAIN (3)

## 2024-01-26 NOTE — PROGRESS NOTES
Preventive Care Visit  [facility] MICHAEL Casanova PA-C, Family Medicine  Jan 26, 2024       SUBJECTIVE:   Nabila is a 38 year old, presenting for the following:  Physical        1/26/2024     8:11 AM   Additional Questions   Roomed by MR   Accompanied by NA         1/26/2024     8:11 AM   Patient Reported Additional Medications   Patient reports taking the following new medications NA     Healthy Habits:     Getting at least 3 servings of Calcium per day:  Yes    Bi-annual eye exam:  Yes    Dental care twice a year:  Yes    Sleep apnea or symptoms of sleep apnea:  Daytime drowsiness    Diet:  Regular (no restrictions)    Frequency of exercise:  6-7 days/week    Duration of exercise:  15-30 minutes    Taking medications regularly:  Yes    Medication side effects:  None    Additional concerns today:  Yes    She has been taking progesterone only pill due to breastfeeding but she stopped breastfeeding in May 2023. She would like to go back to birth control pill she took prior to pregnancy/breastfeeding. She wants to stay on pill, not interested in IUD, nexplanon, or other birth control option. Non-smoker, no migraine with aura, no family or personal history of blood clotting disorder. Mom had PE but this was provoked in the setting of cancer, hospitalization. No HTN.    Blemish concern  She feels she has more pimples than a normal 38 yr old. She sometimes has deep, painful lesions but more often just pimples. She does tend to have hyperpigmented spots in some areas after pimple goes away. Wondering if different birth control could help with acne.    Rash on neck intermittently from June - November 2023. Red, dry, itchy patches. Improved some with hydrocortisone. She has not had rash in last 2 months. No new exposures. No history of similar rash. Denies history of eczema.    Low back pain - bilateral lower back, no radiation  On and off over the past year  Seems better with activity,  exercising  Not waking her up at night  No injury or trauma  No leg pain, numbness/tingling, weakness  No saddle anesthesia or loss of bowel or bladder control  Wondering about physical therapy    Wrists have been hurting on and off for past few months. She has a 22 month old daughter that she lifts/carries a lot and she also works on a computer doing a lot of typing. Wrists are more uncomfortable after a lot of computer work/typing. No swelling, redness, warmth. No hand/arm numbness/tingling or weakness.      Social History     Tobacco Use    Smoking status: Former     Packs/day: 1.00     Years: 4.00     Additional pack years: 0.00     Total pack years: 4.00     Types: Cigarettes     Quit date:      Years since quittin.0    Smokeless tobacco: Never   Substance Use Topics    Alcohol use: Yes     Alcohol/week: 4.2 standard drinks of alcohol     Types: 5 Standard drinks or equivalent per week     Comment: 1 glass of wine daily             2024     8:11 AM   Alcohol Use   Prescreen: >3 drinks/day or >7 drinks/week? Yes   AUDIT SCORE  6         2024     8:11 AM   AUDIT - Alcohol Use Disorders Identification Test - Reproduced from the World Health Organization Audit 2001 (Second Edition)   1.  How often do you have a drink containing alcohol? 4 or more times a week   2.  How many drinks containing alcohol do you have on a typical day when you are drinking? 1 or 2   3.  How often do you have five or more drinks on one occasion? Less than monthly   4.  How often during the last year have you found that you were not able to stop drinking once you had started? Never   5.  How often during the last year have you failed to do what was normally expected of you because of drinking? Never   6.  How often during the last year have you needed a first drink in the morning to get yourself going after a heavy drinking session? Never   7.  How often during the last year have you had a feeling of guilt or remorse after  drinking? Less than monthly   8.  How often during the last year have you been unable to remember what happened the night before because of your drinking? Never   9.  Have you or someone else been injured because of your drinking? No   10. Has a relative, friend, doctor or other health care worker been concerned about your drinking or suggested you cut down? No   TOTAL SCORE 6     Reviewed orders with patient.  Reviewed health maintenance and updated orders accordingly - Yes  Lab work is in process  Labs reviewed in EPIC  BP Readings from Last 3 Encounters:   24 120/78   23 125/80   22 111/70    Wt Readings from Last 3 Encounters:   24 57.7 kg (127 lb 1.6 oz)   23 55.4 kg (122 lb 3.2 oz)   22 57 kg (125 lb 11.2 oz)                  Patient Active Problem List   Diagnosis    Benign neoplasm of skin    Family history of malignant melanoma of skin    History of abnormal Pap smear     Past Surgical History:   Procedure Laterality Date     SECTION N/A 2022    Procedure:  SECTION;  Surgeon: Valentin Singh MD;  Location:  L+D    DILATION AND CURETTAGE, HYSTEROSCOPY DIAGNOSTIC, COMBINED N/A 2016    Procedure: COMBINED DILATION AND CURETTAGE, HYSTEROSCOPY DIAGNOSTIC;  Surgeon: Aj Clemons MD;  Location:  OR    SURGICAL HISTORY OF -   4/10    colposcopy; mild dysplasia; no treatment.    SURGICAL HISTORY OF -       colposcopy; recommended repeat       Social History     Tobacco Use    Smoking status: Former     Packs/day: 1.00     Years: 4.00     Additional pack years: 0.00     Total pack years: 4.00     Types: Cigarettes     Quit date:      Years since quittin.0    Smokeless tobacco: Never   Substance Use Topics    Alcohol use: Yes     Alcohol/week: 4.2 standard drinks of alcohol     Types: 5 Standard drinks or equivalent per week     Comment: 1 glass of wine daily     Family History   Problem Relation Age of Onset    Melanoma Mother      Heart Disease Mother         murmur    Thyroid Disease Mother     Pulmonary Embolism Mother         provoked    Colon Polyps Mother     Aneurysm Father         brain calcified aneurysm    Prostate Cancer Father     Seizure Disorder Father     Cerebrovascular Disease Father     Polycystic ovary syndrome Sister     Rheumatoid Arthritis Sister     Melanoma Paternal Grandmother     ANASTASIA. Paternal Grandmother         valve replacement    Breast Cancer Paternal Grandmother     CNattyANattyD. Paternal Grandfather         triple bypass    Prostate Cancer Paternal Grandfather     Breast Cancer Paternal Aunt     Diabetes Other         mom's side of family; not immediate    Colon Cancer No family hx of          Current Outpatient Medications   Medication Sig Dispense Refill    Cetirizine HCl (ZYRTEC PO)       ethynodiol-ethinyl estradiol (KELNOR) 1-35 MG-MCG tablet Take 1 tablet by mouth daily 84 tablet 3    Multiple Vitamin (MULTI VITAMIN PO)       norethindrone (MICRONOR) 0.35 MG tablet TAKE ONE TABLET BY MOUTH ONCE DAILY 84 tablet 0    tretinoin (RETIN-A) 0.025 % external cream Apply topically at bedtime Recommend starting treatment slowly to minimize risk for skin irritation. Start by using every 3 nights for 1-2 weeks, then increase to every other night for 1-2 weeks, then increase to every night as tolerated. Apply a thin layer (pea-sized amount) to the face. 45 g 4     No Known Allergies  Recent Labs   Lab Test 07/25/23  1424 07/07/20  0826 05/14/20  1433   LDL  --  84  --    HDL  --  60  --    TRIG  --  75  --    ALT  --   --  16   CR  --   --  0.62   GFRESTIMATED  --   --  >90   GFRESTBLACK  --   --  >90   POTASSIUM  --   --  3.8   TSH 3.24  --  1.31        Breast Cancer Screening:  Any new diagnosis of family breast, ovarian, or bowel cancer? No    FHS-7:       12/13/2022    10:08 AM 1/26/2024     8:13 AM   Breast CA Risk Assessment (FHS-7)   Did any of your first-degree relatives have breast or ovarian cancer? No Unknown    Did any of your relatives have bilateral breast cancer? Yes No   Did any man in your family have breast cancer? No No   Did any woman in your family have breast and ovarian cancer? Yes Yes   Did any woman in your family have breast cancer before age 50 y? Unknown Unknown   Do you have 2 or more relatives with breast and/or ovarian cancer? Yes Yes   Do you have 2 or more relatives with breast and/or bowel cancer? Yes Yes     Paternal grandma and paternal aunt had breast cancer. No other family history of breast, ovarian, or colon cancer.     Patient under 40 years of age: Routine Mammogram Screening not recommended.   Pertinent mammograms are reviewed under the imaging tab.    History of abnormal Pap smear:       Latest Ref Rng & Units 10/21/2021     9:16 AM 2017    11:13 AM 2017    12:00 AM   PAP / HPV   PAP  Negative for Intraepithelial Lesion or Malignancy (NILM)      PAP (Historical)    NIL    HPV 16 DNA Negative Negative  Negative     HPV 18 DNA Negative Negative  Negative     Other HR HPV Negative Negative  Negative       Reviewed and updated as needed this visit by clinical staff   Tobacco  Allergies  Meds  Problems  Med Hx  Surg Hx  Fam Hx          Reviewed and updated as needed this visit by Provider   Tobacco  Allergies  Meds  Problems  Med Hx  Surg Hx  Fam Hx          Past Medical History:   Diagnosis Date    Abnormal Pap smear of cervix 2009    Endocrine disease     Excessive or frequent menstruation 2016    Fibroid     LSIL (low grade squamous intraepithelial lesion) on Pap smear 2010    STEPHANIE I on colp 2010    SIADH (syndrome of inappropriate ADH production) (H24) 2011    hospitalized. will see Endocrinologist    Varicella       Past Surgical History:   Procedure Laterality Date     SECTION N/A 2022    Procedure:  SECTION;  Surgeon: Valentin Singh MD;  Location:  L+D    DILATION AND CURETTAGE, HYSTEROSCOPY DIAGNOSTIC, COMBINED N/A  "2016    Procedure: COMBINED DILATION AND CURETTAGE, HYSTEROSCOPY DIAGNOSTIC;  Surgeon: Aj Clemons MD;  Location: [facility] OR    SURGICAL HISTORY OF -   4/10    colposcopy; mild dysplasia; no treatment.    SURGICAL HISTORY OF -       colposcopy; recommended repeat     OB History    Para Term  AB Living   1 1 1 0 0 1   SAB IAB Ectopic Multiple Live Births   0 0 0 0 1      # Outcome Date GA Lbr Josh/2nd Weight Sex Delivery Anes PTL Lv   1 Term 22 40w1d  3.36 kg (7 lb 6.5 oz) F CS-LTranv EPI N RIO      Complications: Failure to Progress in First Stage      Name: MONCHO NASH-INDY      Apgar1: 8  Apgar5: 9     Review of Systems   Constitutional:  Negative for chills and fever.   HENT:  Negative for congestion, ear pain, hearing loss and sore throat.    Eyes:  Negative for pain and visual disturbance.   Respiratory:  Negative for cough and shortness of breath.    Cardiovascular:  Negative for chest pain and palpitations.   Gastrointestinal:  Negative for abdominal pain, constipation, diarrhea and nausea.   Genitourinary:  Negative for dysuria, frequency, genital sores, hematuria, pelvic pain, urgency, vaginal bleeding and vaginal discharge.   Musculoskeletal:  Negative for arthralgias, joint swelling and myalgias.   Skin:  Negative for rash.   Neurological:  Negative for dizziness, weakness and headaches.   Psychiatric/Behavioral:  The patient is not nervous/anxious.        OBJECTIVE:   /78 (BP Location: Right arm, Patient Position: Sitting, Cuff Size: Adult Regular)   Pulse 89   Temp 99.2  F (37.3  C) (Oral)   Resp 14   Ht 1.727 m (5' 8\")   Wt 57.7 kg (127 lb 1.6 oz)   LMP  (LMP Unknown)   SpO2 98%   Breastfeeding No   BMI 19.33 kg/m     Estimated body mass index is 19.33 kg/m  as calculated from the following:    Height as of this encounter: 1.727 m (5' 8\").    Weight as of this encounter: 57.7 kg (127 lb 1.6 oz).  Physical Exam  GENERAL: alert and no distress  EYES: Eyes " grossly normal to inspection, PERRL and conjunctivae and sclerae normal  HENT: ear canals and TM's normal, nose and mouth without ulcers or lesions  NECK: no adenopathy, no asymmetry, masses, or scars  RESP: lungs clear to auscultation - no rales, rhonchi or wheezes  CV: regular rate and rhythm, normal S1 S2, no S3 or S4, no murmur, click or rub, no peripheral edema  ABDOMEN: soft, nontender, no hepatosplenomegaly, no masses and bowel sounds normal  MS: no gross musculoskeletal defects noted, no edema  Comprehensive back pain exam:  No tenderness, Range of motion not limited by pain, Lower extremity strength functional and equal on both sides, Lower extremity reflexes within normal limits bilaterally, Lower extremity sensation normal and equal on both sides, and Straight leg raise negative bilaterally  SKIN: mild facial acne  NEURO: Normal strength and tone, mentation intact and speech normal  PSYCH: mentation appears normal, affect normal/bright  LYMPH: no cervical, supraclavicular adenopathy    Diagnostic Test Results:  Labs reviewed in Epic    ASSESSMENT/PLAN:   Routine general medical examination at a health care facility  Reviewed personal and family history. Reviewed age appropriate screenings. Reviewed healthy BP and BMI ranges. Counseled on lifestyle modifications for optimal mental and physical health.  Discussed age-appropriate health maintenance. Recommended any needed vaccinations. Continue to focus on well balanced diet and exercise.     Acne vulgaris  Discussed. There could be some improvement with changing to AIRAM but she can also try tretinoin. Discussed r/b/se.   - tretinoin (RETIN-A) 0.025 % external cream; Apply topically at bedtime Recommend starting treatment slowly to minimize risk for skin irritation. Start by using every 3 nights for 1-2 weeks, then increase to every other night for 1-2 weeks, then increase to every night as tolerated. Apply a thin layer (pea-sized amount) to the  face.    Encounter for initial prescription of contraceptive pills  Transitioning from progestin only pill to AIRAM. No contraindications.  - ethynodiol-ethinyl estradiol (KELNOR) 1-35 MG-MCG tablet; Take 1 tablet by mouth daily    Family history of thyroid disease  She would like this screened. Normal testing 7/2023.  - TSH with free T4 reflex; Future  - TSH with free T4 reflex    Screening for diabetes mellitus  - Glucose; Future  - Glucose    Lipid screening  She is fasting today  - Lipid panel reflex to direct LDL Fasting; Future  - Lipid panel reflex to direct LDL Fasting    Chronic bilateral low back pain without sciatica  Over the past year. No red flag symptoms. Due to duration, will obtain x-ray. Recommend physical therapy. Follow-up if worsening or no improvement, or if any red flag symptoms.  - XR Lumbar Spine 2/3 Views; Future  - Physical Therapy Referral; Future     Pain in both wrists  Recommend trying to reposition computer/keyboard since symptoms are worse with a lot of typing. Could try wrist braces. Monitor and follow-up if worsening or no improvement.    Patient has been advised of split billing requirements and indicates understanding: Yes      Counseling  Reviewed preventive health counseling, as reflected in patient instructions        She reports that she quit smoking about 7 years ago. Her smoking use included cigarettes. She has a 4 pack-year smoking history. She has never used smokeless tobacco.          Signed Electronically by: Poonam Casanova PA-C

## 2024-01-26 NOTE — COMMUNITY RESOURCES LIST (ENGLISH)
01/26/2024   [facility]  N/A  For questions about this resource list or additional care needs, please contact your primary care clinic or care manager.  Phone: [phone]   Email: N/A   Address: [address] [place] [address]   Hours: N/A        Food and Nutrition       Food pantry  1  [address] [place] [address] Distance: 2.12 miles      In-Person, Pickup   [address] [place] [address]  Language: English  Hours: Mon 8:00 AM - 4:00 PM , Tue 8:00 AM - 7:00 PM , Wed - Thu 8:00 AM - 4:00 PM  Fees: Free   Phone: (681) 528-7644 Email: info@[employer] Website: https://[employer]/resources/resource-centers/     2  [facility] Distance: 2.81 miles      In-Person   [address] [place] [address]  Language: English, [place]  Hours: Mon - Fri 5:00 AM - 10:00 PM  Fees: Free   Phone: (978) 259-2241 Email: admissions@[email or web address] Website: http://[email or web address]     SNAP application assistance  3  [employer] [facility] Distance: 1.88 miles      In-Person   [address], [place] [address]  Language: English, [place]  Hours: Mon - Fri 8:00 AM - 8:00 PM  Fees: Free   Phone: (580) 718-7386 Email: info@[facility] Website: http://www.[employer].org     4  [address] [place] [address] Distance: 2.12 miles      In-Person   [address] [place] [address]  Language: English  Hours: Mon 8:00 AM - 4:00 PM , Tue 8:00 AM - 7:00 PM , Wed - Thu 8:00 AM - 4:00 PM  Fees: Free   Phone: (508) 513-3808 Email: info@[employer] Website: https://[employer]/resources/resource-centers/     Soup kitchen or free meals  5  [facility] Distance: 3 miles      Pickup   [address] [place] [address]  Language: English, [place]  Hours: Mon - Thu 5:30 PM - 6:30 PM  Fees: Free   Phone:  (384) 838-6282 Email: alexei@[employer] Website: http://[employer]/wordpress/?page_id=5168     6  [facility] Distance: 7.53 miles      In-Person, Pickup   [address] [place] [address]  Language: English  Hours: Mon - Thu 5:00 PM - 6:30 PM  Fees: Free   Phone: (331) 462-4806 Email: office@[facility] Website: http://[facility]/          Important Numbers & Websites       Emergency Services   911  [facility]   311  Poison Control   (784) 423-1442  Suicide Prevention Lifeline   (402) 957-5959 (TALK)  Child Abuse Hotline   (418) 380-8895 (4-A-Child)  Sexual Assault Hotline   (331) 477-4386 (HOPE)  National Runaway Safeline   (641) 101-4368 (RUNAWAY)  All-Options Talkline   (210) 568-4038  Substance Abuse Referral   (594) 845-3036 (HELP)

## 2024-01-26 NOTE — PATIENT INSTRUCTIONS
Discussed using tretinoin cream. Apply to dry skin after washing at night.    Skin irritation is a common and expected side effect of this medication. Recommend starting treatment slowly to minimize risk for skin irritation. Start by using every 3 nights for 1-2 weeks, then increase to every other night for 1-2 weeks, then increase to every night as tolerated. Apply a thin layer (pea-sized amount) to the face.     This can make skin more sensitive to the sun so be sure to use sunscreen and avoid lots of sun exposure. Sometimes symptoms may worsen before improving.    Do not apply tretinoin and benzoyl peroxide at the same time. If using benzoyl peroxide, use in the morning and tretinoin in the evenings.    Preventive Health Recommendations  Female Ages 26 - 39  Yearly exam:   See your health care provider every year in order to  Review health changes.   Discuss preventive care.    Review your medicines if you your doctor has prescribed any.    Until age 30: Get a Pap test every three years (more often if you have had an abnormal result).    After age 30: Talk to your doctor about whether you should have a Pap test every 3 years or have a Pap test with HPV screening every 5 years.   You do not need a Pap test if your uterus was removed (hysterectomy) and you have not had cancer.  You should be tested each year for STDs (sexually transmitted diseases), if you're at risk.   Talk to your provider about how often to have your cholesterol checked.  If you are at risk for diabetes, you should have a diabetes test (fasting glucose).  Shots: Get a flu shot each year. Get a tetanus shot every 10 years.   Nutrition:   Eat at least 5 servings of fruits and vegetables each day.  Eat whole-grain bread, whole-wheat pasta and brown rice instead of white grains and rice.  Get adequate Calcium and Vitamin D.     Lifestyle  Exercise at least 150 minutes a week (30 minutes a day, 5 days of the week). This will help you control your  weight and prevent disease.  Limit alcohol to one drink per day.  No smoking.   Wear sunscreen to prevent skin cancer.  See your dentist every six months for an exam and cleaning.

## 2024-01-29 ENCOUNTER — ANCILLARY PROCEDURE (OUTPATIENT)
Dept: GENERAL RADIOLOGY | Facility: CLINIC | Age: 39
End: 2024-01-29
Attending: PHYSICIAN ASSISTANT
Payer: COMMERCIAL

## 2024-01-29 DIAGNOSIS — M54.50 CHRONIC BILATERAL LOW BACK PAIN WITHOUT SCIATICA: ICD-10-CM

## 2024-01-29 DIAGNOSIS — G89.29 CHRONIC BILATERAL LOW BACK PAIN WITHOUT SCIATICA: ICD-10-CM

## 2024-01-29 PROCEDURE — 72100 X-RAY EXAM L-S SPINE 2/3 VWS: CPT | Mod: TC | Performed by: RADIOLOGY

## 2024-02-01 ENCOUNTER — THERAPY VISIT (OUTPATIENT)
Dept: PHYSICAL THERAPY | Facility: CLINIC | Age: 39
End: 2024-02-01
Attending: PHYSICIAN ASSISTANT
Payer: COMMERCIAL

## 2024-02-01 DIAGNOSIS — G89.29 CHRONIC BILATERAL LOW BACK PAIN WITHOUT SCIATICA: ICD-10-CM

## 2024-02-01 DIAGNOSIS — M54.50 CHRONIC BILATERAL LOW BACK PAIN WITHOUT SCIATICA: ICD-10-CM

## 2024-02-01 PROCEDURE — 97112 NEUROMUSCULAR REEDUCATION: CPT | Mod: GP | Performed by: PHYSICAL THERAPIST

## 2024-02-01 PROCEDURE — 97110 THERAPEUTIC EXERCISES: CPT | Mod: GP | Performed by: PHYSICAL THERAPIST

## 2024-02-01 PROCEDURE — 97161 PT EVAL LOW COMPLEX 20 MIN: CPT | Mod: GP | Performed by: PHYSICAL THERAPIST

## 2024-02-01 NOTE — PROGRESS NOTES
PHYSICAL THERAPY EVALUATION  Type of Visit: Evaluation    See electronic medical record for Abuse and Falls Screening details.    Subjective       Presenting condition or subjective complaint: Main concern is intermittent LBP that started @ one year ago.  may be related to having her daughter 18 months ago.  Can have pain with sitting, standing after sitting and standing.  LB can hurt with bending, lifting.  Denies any leg pain or weakness.  Has had 1-2 episodes of acute back pain in the past.  No history of trauma.  Date of onset:      Relevant medical history: Pain at night or rest; Pregnant or breastfeeding   Dates & types of surgery:  .    Prior diagnostic imaging/testing results: X-ray     Prior therapy history for the same diagnosis, illness or injury:        Prior Level of Function  Transfers: Independent    Living Environment  Social support: With family members   Type of home: House; 2-story   Stairs to enter the home: Yes       Ramp: No   Stairs inside the home: Yes       Help at home: None  Equipment owned:       Employment:     Hobbies/Interests: walking, reading, camping    Patient goals for therapy:         Objective   LUMBAR SPINE EVALUATION  PAIN: Pain Level at Rest: 0/10  Pain Level with Use: 5/10    POSTURE: Sitting Posture: Rounded shoulders, Thoracic kyphosis increased  BALANCE/PROPRIOCEPTION: WFL  WEIGHTBEARING ALIGNMENT:  iliac crest level.  Rt PSIS high.    NON-WEIGHTBEARING ALIGNMENT: rt ASIS low in supine, high prone.  Slight LLD, right longer supine  ROM:  Lx arom 100-, ext 100+.  B SB wnls.  B rotation seated 75+.   PELVIC/SI SCREEN:  see above.  Standing, stork + right  STRENGTH:  weak core.  Need to assess hip, l/e    MYOTOMES: WNL    NEURAL TENSION:  SLR, PKB - rt and lt    LUMBAR/HIP Special Tests:  hip prom wnls.        PALPATION:  not assessed  SPINAL SEGMENTAL CONCLUSIONS:  hypo L4,5 PA, -      Assessment & Plan   CLINICAL IMPRESSIONS  Medical Diagnosis:  Chronic B LBP without sciatica    Treatment Diagnosis:     Impression/Assessment: Patient is a 38 year old female with LBP complaints.  The following significant findings have been identified: Pain, Decreased ROM/flexibility, Decreased joint mobility, Decreased strength, Decreased activity tolerance, and Impaired posture. These impairments interfere with their ability to perform self care tasks, recreational activities, household chores, and household mobility as compared to previous level of function.     Clinical Decision Making (Complexity):  Clinical Presentation: Stable/Uncomplicated  Clinical Presentation Rationale: based on medical and personal factors listed in PT evaluation  Clinical Decision Making (Complexity): Low complexity    PLAN OF CARE  Treatment Interventions:  Interventions: Neuromuscular Re-education, Therapeutic Activity, Therapeutic Exercise    Long Term Goals            Frequency of Treatment: 1x/week  Duration of Treatment:      Education Assessment:   Learner/Method: Patient;Listening;Demonstration;Pictures/Video  Education Comments: Patient participated in their education    Risks and benefits of evaluation/treatment have been explained.   Patient/Family/caregiver agrees with Plan of Care.     Evaluation Time:             Signing Clinician: Prem Raymond, PT

## 2024-02-07 ENCOUNTER — THERAPY VISIT (OUTPATIENT)
Dept: PHYSICAL THERAPY | Facility: CLINIC | Age: 39
End: 2024-02-07
Payer: COMMERCIAL

## 2024-02-07 DIAGNOSIS — G89.29 CHRONIC BILATERAL LOW BACK PAIN WITHOUT SCIATICA: Primary | ICD-10-CM

## 2024-02-07 DIAGNOSIS — M54.50 CHRONIC BILATERAL LOW BACK PAIN WITHOUT SCIATICA: Primary | ICD-10-CM

## 2024-02-07 PROCEDURE — 97110 THERAPEUTIC EXERCISES: CPT | Mod: GP | Performed by: PHYSICAL THERAPIST

## 2024-02-07 PROCEDURE — 97112 NEUROMUSCULAR REEDUCATION: CPT | Mod: GP | Performed by: PHYSICAL THERAPIST

## 2024-02-07 PROCEDURE — 97140 MANUAL THERAPY 1/> REGIONS: CPT | Mod: GP | Performed by: PHYSICAL THERAPIST

## 2024-02-14 ENCOUNTER — THERAPY VISIT (OUTPATIENT)
Dept: PHYSICAL THERAPY | Facility: CLINIC | Age: 39
End: 2024-02-14
Payer: COMMERCIAL

## 2024-02-14 DIAGNOSIS — G89.29 CHRONIC BILATERAL LOW BACK PAIN WITHOUT SCIATICA: Primary | ICD-10-CM

## 2024-02-14 DIAGNOSIS — M54.50 CHRONIC BILATERAL LOW BACK PAIN WITHOUT SCIATICA: Primary | ICD-10-CM

## 2024-02-14 PROCEDURE — 97110 THERAPEUTIC EXERCISES: CPT | Mod: GP | Performed by: PHYSICAL THERAPIST

## 2024-02-14 PROCEDURE — 97112 NEUROMUSCULAR REEDUCATION: CPT | Mod: GP | Performed by: PHYSICAL THERAPIST

## 2024-02-14 PROCEDURE — 97140 MANUAL THERAPY 1/> REGIONS: CPT | Mod: GP | Performed by: PHYSICAL THERAPIST

## 2024-02-23 ENCOUNTER — THERAPY VISIT (OUTPATIENT)
Dept: PHYSICAL THERAPY | Facility: CLINIC | Age: 39
End: 2024-02-23
Payer: COMMERCIAL

## 2024-02-23 DIAGNOSIS — G89.29 CHRONIC BILATERAL LOW BACK PAIN WITHOUT SCIATICA: Primary | ICD-10-CM

## 2024-02-23 DIAGNOSIS — M54.50 CHRONIC BILATERAL LOW BACK PAIN WITHOUT SCIATICA: Primary | ICD-10-CM

## 2024-02-23 PROCEDURE — 97112 NEUROMUSCULAR REEDUCATION: CPT | Mod: GP | Performed by: PHYSICAL THERAPIST

## 2024-02-23 PROCEDURE — 97110 THERAPEUTIC EXERCISES: CPT | Mod: GP | Performed by: PHYSICAL THERAPIST

## 2024-02-28 ENCOUNTER — THERAPY VISIT (OUTPATIENT)
Dept: PHYSICAL THERAPY | Facility: CLINIC | Age: 39
End: 2024-02-28
Payer: COMMERCIAL

## 2024-02-28 DIAGNOSIS — M54.50 CHRONIC BILATERAL LOW BACK PAIN WITHOUT SCIATICA: Primary | ICD-10-CM

## 2024-02-28 DIAGNOSIS — G89.29 CHRONIC BILATERAL LOW BACK PAIN WITHOUT SCIATICA: Primary | ICD-10-CM

## 2024-02-28 PROCEDURE — 97530 THERAPEUTIC ACTIVITIES: CPT | Mod: GP | Performed by: PHYSICAL THERAPIST

## 2024-02-28 PROCEDURE — 97110 THERAPEUTIC EXERCISES: CPT | Mod: GP | Performed by: PHYSICAL THERAPIST

## 2024-03-13 ENCOUNTER — THERAPY VISIT (OUTPATIENT)
Dept: PHYSICAL THERAPY | Facility: CLINIC | Age: 39
End: 2024-03-13
Payer: COMMERCIAL

## 2024-03-13 DIAGNOSIS — M54.50 CHRONIC BILATERAL LOW BACK PAIN WITHOUT SCIATICA: Primary | ICD-10-CM

## 2024-03-13 DIAGNOSIS — G89.29 CHRONIC BILATERAL LOW BACK PAIN WITHOUT SCIATICA: Primary | ICD-10-CM

## 2024-03-13 PROCEDURE — 97110 THERAPEUTIC EXERCISES: CPT | Mod: GP | Performed by: PHYSICAL THERAPIST

## 2024-03-13 PROCEDURE — 97140 MANUAL THERAPY 1/> REGIONS: CPT | Mod: GP | Performed by: PHYSICAL THERAPIST

## 2024-03-27 ENCOUNTER — THERAPY VISIT (OUTPATIENT)
Dept: PHYSICAL THERAPY | Facility: CLINIC | Age: 39
End: 2024-03-27
Payer: COMMERCIAL

## 2024-03-27 DIAGNOSIS — G89.29 CHRONIC BILATERAL LOW BACK PAIN WITHOUT SCIATICA: Primary | ICD-10-CM

## 2024-03-27 DIAGNOSIS — M54.50 CHRONIC BILATERAL LOW BACK PAIN WITHOUT SCIATICA: Primary | ICD-10-CM

## 2024-03-27 PROCEDURE — 97110 THERAPEUTIC EXERCISES: CPT | Mod: GP | Performed by: PHYSICAL THERAPIST

## 2024-03-27 PROCEDURE — 97530 THERAPEUTIC ACTIVITIES: CPT | Mod: GP | Performed by: PHYSICAL THERAPIST

## 2024-03-27 PROCEDURE — 97140 MANUAL THERAPY 1/> REGIONS: CPT | Mod: GP | Performed by: PHYSICAL THERAPIST

## 2024-04-10 ENCOUNTER — THERAPY VISIT (OUTPATIENT)
Dept: PHYSICAL THERAPY | Facility: CLINIC | Age: 39
End: 2024-04-10
Payer: COMMERCIAL

## 2024-04-10 DIAGNOSIS — G89.29 CHRONIC BILATERAL LOW BACK PAIN WITHOUT SCIATICA: Primary | ICD-10-CM

## 2024-04-10 DIAGNOSIS — M54.50 CHRONIC BILATERAL LOW BACK PAIN WITHOUT SCIATICA: Primary | ICD-10-CM

## 2024-04-10 PROCEDURE — 97530 THERAPEUTIC ACTIVITIES: CPT | Mod: GP | Performed by: PHYSICAL THERAPIST

## 2024-04-10 PROCEDURE — 97110 THERAPEUTIC EXERCISES: CPT | Mod: GP | Performed by: PHYSICAL THERAPIST

## 2024-04-10 PROCEDURE — 97140 MANUAL THERAPY 1/> REGIONS: CPT | Mod: GP | Performed by: PHYSICAL THERAPIST

## 2024-05-08 ENCOUNTER — THERAPY VISIT (OUTPATIENT)
Dept: PHYSICAL THERAPY | Facility: CLINIC | Age: 39
End: 2024-05-08
Payer: COMMERCIAL

## 2024-05-08 DIAGNOSIS — G89.29 CHRONIC BILATERAL LOW BACK PAIN WITHOUT SCIATICA: Primary | ICD-10-CM

## 2024-05-08 DIAGNOSIS — M54.50 CHRONIC BILATERAL LOW BACK PAIN WITHOUT SCIATICA: Primary | ICD-10-CM

## 2024-05-08 PROCEDURE — 97110 THERAPEUTIC EXERCISES: CPT | Mod: GP | Performed by: PHYSICAL THERAPIST

## 2024-05-08 PROCEDURE — 97112 NEUROMUSCULAR REEDUCATION: CPT | Mod: GP | Performed by: PHYSICAL THERAPIST

## 2024-05-08 PROCEDURE — 97530 THERAPEUTIC ACTIVITIES: CPT | Mod: GP | Performed by: PHYSICAL THERAPIST

## 2024-05-15 NOTE — PROGRESS NOTES
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  Halo nevus under dermoscopy appears reassuring today, stable from previous exam. There is no need for further treatment of these benign skin findings today. Will have her schedule follow up in 1 year given strong family hx of melanoma. Discussed signs/symptoms concerning for NMSC and melanoma.  - ABCDEs: Counseled ABCDEs of melanoma: Asymmetry, Border (irregularity), Color (not uniform, changes in color), Diameter (greater than 6 mm which is about the size of a pencil eraser), and Evolving (any changes in preexisting moles).  - Sun protection: Counseled SPF30+ sunscreen, UPF clothing, sun avoidance, tanning bed avoidance.    Procedures Performed:   None    Follow-up: 1 year(s) in-person, or earlier for new or changing lesions    Staff and Resident:     Tequila Do MD  PGY3 Dermatology Resident    I have personally examined this patient and agree with the resident doctor's documentation and plan of care. I have reviewed and amended the resident's note above. The documentation accurately reflects my clinical observations, diagnoses, treatment and follow-up plans.     Lilly Jaimes MD  Dermatology Staff    ____________________________________________    CC: Skin Check (Annual FBSE.  No new concerns)    HPI:  Ms. Nabila Zapata is a(n) 38 year old female who presents today as a return patient for a FBSE. Last seen 1 year ago for FBSE.    Nabila denies having any spots of concern today on her skin - nothing new, growing, changing, tender, crusting, or bleeding. Halo nevus on  her R upper arm has been unchanged from her last visit.    Patient denies having a personal history of skin cancer. States that her mother and paternal grandmother have a history of melanoma - mother was diagnosed in her 50's. Reports using tanning beds in high school (~20 sessions total). No history of thyroid disease. Has been using sunscreen routinely when outside.    Patient is otherwise feeling well, without additional skin concerns.    Labs Reviewed:  N/A    Physical Exam:  Vitals: There were no vitals taken for this visit.  SKIN: Full skin, which includes the head/face, both arms, chest, back, abdomen,both legs, genitalia and/or groin buttocks, digits and/or nails, was examined.  - There is one solitary small dark brown evenly pigmented macule with surrounding hypopigmentation on the R upper arm. Unchanged from prior exam.  - There are dome shaped bright red papules on the trunk and extremities.   - Multiple regular brown pigmented macules and papules are identified on the face, trunk, and extremities.   - Scattered brown macules on sun exposed areas.  - No other lesions of concern on areas examined.     Medications:  Current Outpatient Medications   Medication Sig Dispense Refill    Cetirizine HCl (ZYRTEC PO)       ethynodiol-ethinyl estradiol (KELNOR) 1-35 MG-MCG tablet Take 1 tablet by mouth daily 84 tablet 3    Multiple Vitamin (MULTI VITAMIN PO)       tretinoin (RETIN-A) 0.025 % external cream Apply topically at bedtime Recommend starting treatment slowly to minimize risk for skin irritation. Start by using every 3 nights for 1-2 weeks, then increase to every other night for 1-2 weeks, then increase to every night as tolerated. Apply a thin layer (pea-sized amount) to the face. 45 g 4     No current facility-administered medications for this visit.      Past Medical History:   Patient Active Problem List   Diagnosis    Benign neoplasm of skin    Family history of malignant melanoma of skin    History of  abnormal Pap smear    Chronic bilateral low back pain without sciatica     Past Medical History:   Diagnosis Date    Abnormal Pap smear of cervix 12/2009    Endocrine disease     Excessive or frequent menstruation 02/08/2016    Fibroid     LSIL (low grade squamous intraepithelial lesion) on Pap smear 2009, 2010    STEPHANIE I on colp 4/2010    SIADH (syndrome of inappropriate ADH production) (H24) 09/2011    hospitalized. will see Endocrinologist    Varicella      ANCA Casanova PA-C  [address] [place] [address] on close of this encounter.

## 2024-05-16 ENCOUNTER — OFFICE VISIT (OUTPATIENT)
Dept: DERMATOLOGY | Facility: CLINIC | Age: 39
End: 2024-05-16
Payer: COMMERCIAL

## 2024-05-16 DIAGNOSIS — Z80.8 FAMILY HISTORY OF MALIGNANT MELANOMA OF SKIN: ICD-10-CM

## 2024-05-16 DIAGNOSIS — D22.9 MULTIPLE BENIGN NEVI: Primary | ICD-10-CM

## 2024-05-16 DIAGNOSIS — D22.9 NEVUS, HALO: ICD-10-CM

## 2024-05-16 DIAGNOSIS — L81.4 SOLAR LENTIGO: ICD-10-CM

## 2024-05-16 DIAGNOSIS — D18.01 CHERRY ANGIOMA: ICD-10-CM

## 2024-05-16 PROCEDURE — 99203 OFFICE O/P NEW LOW 30 MIN: CPT | Mod: GC | Performed by: DERMATOLOGY

## 2024-05-16 ASSESSMENT — PAIN SCALES - GENERAL: PAINLEVEL: NO PAIN (0)

## 2024-05-16 NOTE — PATIENT INSTRUCTIONS

## 2024-05-16 NOTE — NURSING NOTE
Dermatology Rooming Note    Nabila Zapata's goals for this visit include:   Chief Complaint   Patient presents with    Skin Check     Annual FBSE.  No new concerns     Char Diaz CMA

## 2024-05-31 ENCOUNTER — THERAPY VISIT (OUTPATIENT)
Dept: PHYSICAL THERAPY | Facility: CLINIC | Age: 39
End: 2024-05-31
Payer: COMMERCIAL

## 2024-05-31 DIAGNOSIS — G89.29 CHRONIC BILATERAL LOW BACK PAIN WITHOUT SCIATICA: Primary | ICD-10-CM

## 2024-05-31 DIAGNOSIS — M54.50 CHRONIC BILATERAL LOW BACK PAIN WITHOUT SCIATICA: Primary | ICD-10-CM

## 2024-05-31 PROCEDURE — 97110 THERAPEUTIC EXERCISES: CPT | Mod: GP | Performed by: PHYSICAL THERAPIST

## 2024-05-31 PROCEDURE — 97530 THERAPEUTIC ACTIVITIES: CPT | Mod: GP | Performed by: PHYSICAL THERAPIST

## 2024-05-31 NOTE — PROGRESS NOTES
PLAN  Patient is making progress towards goals. Progressed core strengthening exercises. Patient will return to PT in about 3-4 weeks if she is still having some pain.     Beginning/End Dates of Progress Note Reporting Period:  02/01/24 to 05/31/2024    Referring Provider:  Poonam Casanova       05/31/24 0500   Appointment Info   Signing clinician's name / credentials Bill Fletcher DPT   Total/Authorized Visits 4   Visits Used 10   Medical Diagnosis Chronic B LBP without sciatica   PT Tx Diagnosis LBP, ? rt SIJ, innominate, core weakness, instability   Progress Note/Certification   Onset of illness/injury or Date of Surgery 02/01/23   Therapy Frequency 1x/week   Progress Note Completed Date 02/01/24       Present No   GOALS   PT Goals 2   PT Goal 1   Goal Identifier Ambulation   Goal Description Patient will be able to walk 1-2 miles with a normal gait pattern.   Rationale to maximize safety and independence with performance of ADLs and functional tasks;to maximize safety and independence within the home;to maximize safety and independence within the community   Target Date 04/24/24   Date Met 05/08/24   PT Goal 2   Goal Identifier lifting   Goal Description will be able to lift 20-30# pain-free   Rationale to maximize safety and independence with performance of ADLs and functional tasks;to maximize safety and independence within the home;to maximize safety and independence within the community   Goal Progress still gets pain lifitng her daughter   Target Date 06/19/24   Subjective Report   Subjective Report Feels she has gotten better. Currenlty waking up in the morning she is having a lot less pain. Notices the pain mostly with bending if she does not tighten her core muscles first, prolonged standing and lifitng her daughter.   Objective Measures   Objective Measures Objective Measure 1;Objective Measure 2   Objective Measure 1   Objective Measure good lower abdominal initial activation  "but fatigues quickly   Details good pelvic alignment   Treatment Interventions (PT)   Interventions Therapeutic Procedure/Exercise;Therapeutic Activity;Neuromuscular Re-education;Manual Therapy   Therapeutic Procedure/Exercise   Therapeutic Procedures: strength, endurance, ROM, flexibility minutes (76002) 26   Therapeutic Procedures Ther Proc 5;Ther Proc 6;Ther Proc 7;Ther Proc 8   Ther Proc 1 - Details Discussed progression of exercises   Ther Proc 8 abdominal march #3 and #3b   Ther Proc 8 - Details difficulty not using upper argelia-hold not for HEP   PTRx Ther Proc 1 Briding   PTRx Ther Proc 1 - Details leg crossed B x 10-30   PTRx Ther Proc 2 Supine Alternating Knee Push with Marching   PTRx Ther Proc 2 - Details rev when needed at home   PTRx Ther Proc 3 Abdomnial activation 90/90 holds   PTRx Ther Proc 3 - Details 20-60 seconds 5 reps fatigues and starts to get pain and has to stop at that point   PTRx Ther Proc 4 side planks   PTRx Ther Proc 4 - Details 30-60 sec B 3 x   PTRx Ther Proc 5 Quadruped   PTRx Ther Proc 5 - Details Leg raises MC and Vc to keep abdominals engaed and not arching through low back 2 x 10   PTRx Ther Proc 7 Double knee to chest   PTRx Ther Proc 7 - Details 30\" holds dec pain   Skilled Intervention MC and VC   Patient Response/Progress tolerated well- difficulty keep abdominals activated thorughout the exercise   Therapeutic Activity   Therapeutic Activities: dynamic activities to improve functional performance minutes (86373) 8   Therapeutic Activities Ther Act 2;Ther Act 3   PTRx Ther Act 1 Body Mechanics - [employer] [place]   PTRx Ther Act 1 - Details also rev as she still gets pain with bending   PTRx Ther Act 2 Sleeping technique   PTRx Ther Act 2 - Details rev sleeping technique and not sleeping on stomach   Skilled Intervention MC and VC   Patient Response/Progress tolerated well- good understanding   Neuromuscular Re-education   Neuromuscular Re-education Neuro Re-ed 2;Neuro Re-ed 3 "   Neuro Re-ed 2 Pulldowns   Neuro Re-ed 2 - Details nt   Manual Therapy   Manual Therapy Manual Therapy 2   Manual Therapy 1 MET   Manual Therapy 1 - Details nt   Education   Learner/Method Patient;Listening;Demonstration;Pictures/Video   Education Comments Patient participated in their education   Plan   Home program patient participated and likes the option of using a interactive video tool like PTRx   Plan for next session will work on exercises a few weeks at home   Total Session Time   Timed Code Treatment Minutes 34   Total Treatment Time (sum of timed and untimed services) 34

## 2024-08-22 PROBLEM — G89.29 CHRONIC BILATERAL LOW BACK PAIN WITHOUT SCIATICA: Status: RESOLVED | Noted: 2024-02-01 | Resolved: 2024-08-22

## 2024-08-22 PROBLEM — M54.50 CHRONIC BILATERAL LOW BACK PAIN WITHOUT SCIATICA: Status: RESOLVED | Noted: 2024-02-01 | Resolved: 2024-08-22

## 2024-11-18 DIAGNOSIS — Z30.011 ENCOUNTER FOR INITIAL PRESCRIPTION OF CONTRACEPTIVE PILLS: ICD-10-CM

## 2024-11-18 RX ORDER — ETHYNODIOL DIACETATE AND ETHINYL ESTRADIOL 1 MG-35MCG
1 KIT ORAL DAILY
Qty: 84 TABLET | Refills: 1 | Status: SHIPPED | OUTPATIENT
Start: 2024-11-18

## 2025-02-24 NOTE — LACTATION NOTE
"Lactation visit prior to discharge. Infant awake and rooting at time of visit. Nabila is comfortable with positioning and brings infant to right breast in cross cradle hold. She is able to attain a deep latch; vigorous with suckling, nutritive suckling pattern noted. Nabila denies having nipple discomfort; breasts appear to be filling.     Discussed physiology of milk production from colostrum through milk coming in and how the breasts should begin to feel \"heavy or full\" between day 3-5. Answered questions regarding \"how to know when infant is done at the breast\". Educated to infant satiety signs; encouraged listening for audible swallows along with watching for changes in infant's stool color. Discussed normal infant weight loss and when infant should be back to birth weight. Stressed the importance of continuing to track infant's feeds and void/stools patterns, at least until infant has returned to his birth weight.     Discussed pumping (when it's helpful, when it's necessary, and when to begin pumping for milk storage), along with when to introduce a bottle. Suggested \"Guide to Postpartum and  Care\" handbook is a great resource going forward for topics that include engorgement, plugged milk ducts, mastitis, safe sleep, and safety of baby.      Bridgett Gibbs RN, IBCLC    " Detail Level: Generalized Detail Level: Detailed

## 2025-03-08 ENCOUNTER — HEALTH MAINTENANCE LETTER (OUTPATIENT)
Age: 40
End: 2025-03-08

## (undated) DEVICE — SU PDS II 0 CT 36" Z358T

## (undated) DEVICE — SUCTION CANISTER MEDIVAC LINER 3000ML W/LID 65651-530

## (undated) DEVICE — GLOVE PROTEXIS W/NEU-THERA 8.0  2D73TE80

## (undated) DEVICE — SU PLAIN 3-0 CT 27" 852H

## (undated) DEVICE — PACK C-SECTION LF PL15OTA83B

## (undated) DEVICE — SOL NACL 0.9% IRRIG 1000ML BOTTLE 07138-09

## (undated) DEVICE — PREP CHLORAPREP 26ML TINTED ORANGE  260815

## (undated) DEVICE — CATH TRAY FOLEY 16FR BARDEX W/DRAIN BAG STATLOCK 300316A

## (undated) DEVICE — LINEN C-SECTION 5415

## (undated) DEVICE — DRSG STERI STRIP 1/2X4" R1547

## (undated) DEVICE — GLOVE PROTEXIS W/NEU-THERA 7.5  2D73TE75

## (undated) DEVICE — SU VICRYL 3-0 KS 27" UND J663H

## (undated) DEVICE — ESU GROUND PAD UNIVERSAL W/O CORD

## (undated) DEVICE — SU VICRYL 2-0 CT 36" J357H

## (undated) DEVICE — SOL BENZOIN 0.5OZ

## (undated) DEVICE — BLADE CLIPPER 4406

## (undated) DEVICE — SU VICRYL 0 CT-1 27" J340H

## (undated) RX ORDER — MORPHINE SULFATE 1 MG/ML
INJECTION, SOLUTION EPIDURAL; INTRATHECAL; INTRAVENOUS
Status: DISPENSED
Start: 2022-05-19

## (undated) RX ORDER — ONDANSETRON 2 MG/ML
INJECTION INTRAMUSCULAR; INTRAVENOUS
Status: DISPENSED
Start: 2022-05-19

## (undated) RX ORDER — LIDOCAINE HCL/EPINEPHRINE/PF 2%-1:200K
VIAL (ML) INJECTION
Status: DISPENSED
Start: 2022-05-19

## (undated) RX ORDER — OXYTOCIN/0.9 % SODIUM CHLORIDE 30/500 ML
PLASTIC BAG, INJECTION (ML) INTRAVENOUS
Status: DISPENSED
Start: 2022-05-19